# Patient Record
Sex: MALE | Race: BLACK OR AFRICAN AMERICAN | Employment: OTHER | URBAN - METROPOLITAN AREA
[De-identification: names, ages, dates, MRNs, and addresses within clinical notes are randomized per-mention and may not be internally consistent; named-entity substitution may affect disease eponyms.]

---

## 2017-01-05 DIAGNOSIS — G40.209 PARTIAL EPILEPSY WITH IMPAIRMENT OF CONSCIOUSNESS (HCC): ICD-10-CM

## 2017-01-05 DIAGNOSIS — I65.23 STENOSIS OF BOTH INTERNAL CAROTID ARTERIES: ICD-10-CM

## 2017-01-05 DIAGNOSIS — G40.209 LOCALIZATION-RELATED PARTIAL EPILEPSY WITH COMPLEX PARTIAL SEIZURES (HCC): ICD-10-CM

## 2017-01-05 DIAGNOSIS — I69.959 HEMIPLEGIA OF DOMINANT SIDE AS LATE EFFECT FOLLOWING CEREBROVASCULAR DISEASE (HCC): ICD-10-CM

## 2017-01-05 DIAGNOSIS — G40.209 COMPLEX PARTIAL SEIZURE EVOLVING TO GENERALIZED SEIZURE (HCC): ICD-10-CM

## 2017-01-05 DIAGNOSIS — I63.39 CEREBRAL INFARCTION DUE TO THROMBOSIS OF OTHER CEREBRAL ARTERY (HCC): ICD-10-CM

## 2017-01-05 DIAGNOSIS — Z86.718 HISTORY OF CEREBRAL THROMBOSIS: ICD-10-CM

## 2017-01-05 RX ORDER — PHENOBARBITAL 30 MG/1
30 TABLET ORAL 4 TIMES DAILY
Qty: 360 TAB | Refills: 5 | Status: SHIPPED | OUTPATIENT
Start: 2017-01-05 | End: 2018-07-11 | Stop reason: SDUPTHER

## 2017-01-05 NOTE — TELEPHONE ENCOUNTER
Future Appointments  Date Time Provider Benedicto Duenas   6/28/2017 9:00 AM Shu Rose MD [address]                         Last Appointment My Department:  6/28/16    Please advise of refill below. Requested Prescriptions     Pending Prescriptions Disp Refills    PHENobarbital (LUMINAL) 30 mg tablet 360 Tab 5     Sig: Take 1 Tab by mouth four (4) times daily.

## 2017-08-24 RX ORDER — PHENOBARBITAL 32.4 MG/1
32.4 TABLET ORAL 4 TIMES DAILY
Qty: 360 TAB | Refills: 1 | Status: SHIPPED | OUTPATIENT
Start: 2017-08-24 | End: 2018-02-15 | Stop reason: SDUPTHER

## 2017-08-24 NOTE — TELEPHONE ENCOUNTER
Future Appointments  Date Time Provider Benedicto Duenas   1/11/2018 8:40 AM Baljit Haas MD [address]                         Last Appointment My Department:  6/28/2016    Please advise of refill below. Requested Prescriptions     Pending Prescriptions Disp Refills    PHENobarbital (LUMINAL) 32.4 mg tablet 360 Tab 1     Sig: Take 1 Tab by mouth four (4) times daily.  Max Daily Amount: 129.6 mg.

## 2017-09-29 DIAGNOSIS — G40.209 COMPLEX PARTIAL SEIZURE EVOLVING TO GENERALIZED SEIZURE (HCC): ICD-10-CM

## 2017-09-29 DIAGNOSIS — I63.39 CEREBRAL INFARCTION DUE TO THROMBOSIS OF OTHER CEREBRAL ARTERY (HCC): ICD-10-CM

## 2017-09-29 DIAGNOSIS — G40.209 LOCALIZATION-RELATED PARTIAL EPILEPSY WITH COMPLEX PARTIAL SEIZURES (HCC): ICD-10-CM

## 2017-09-29 DIAGNOSIS — Z86.718 HISTORY OF CEREBRAL THROMBOSIS: ICD-10-CM

## 2017-09-29 DIAGNOSIS — I65.23 STENOSIS OF BOTH INTERNAL CAROTID ARTERIES: ICD-10-CM

## 2017-09-29 DIAGNOSIS — I69.959 HEMIPLEGIA OF DOMINANT SIDE AS LATE EFFECT FOLLOWING CEREBROVASCULAR DISEASE (HCC): ICD-10-CM

## 2017-09-29 DIAGNOSIS — G40.209 PARTIAL EPILEPSY WITH IMPAIRMENT OF CONSCIOUSNESS (HCC): ICD-10-CM

## 2017-09-29 RX ORDER — PHENYTOIN SODIUM 100 MG/1
100 CAPSULE, EXTENDED RELEASE ORAL 3 TIMES DAILY
Qty: 270 CAP | Refills: 1 | Status: SHIPPED | OUTPATIENT
Start: 2017-09-29 | End: 2018-03-15 | Stop reason: SDUPTHER

## 2017-09-29 NOTE — TELEPHONE ENCOUNTER
Future Appointments  Date Time Provider Benedicto Moorei   1/11/2018 8:40 AM Sage Chao MD [address]                         Last Appointment My Department:  6/30/2016    Please advise of refill below. Requested Prescriptions     Pending Prescriptions Disp Refills    DILANTIN EXTENDED 100 mg ER capsule 270 Cap 1     Sig: Take 1 Cap by mouth three (3) times daily.  GISELA

## 2017-10-11 ENCOUNTER — DOCUMENTATION ONLY (OUTPATIENT)
Dept: NEUROLOGY | Age: 67
End: 2017-10-11

## 2018-02-15 DIAGNOSIS — G40.209 LOCALIZATION-RELATED PARTIAL EPILEPSY WITH COMPLEX PARTIAL SEIZURES (HCC): Primary | ICD-10-CM

## 2018-02-15 RX ORDER — PHENOBARBITAL 32.4 MG/1
32.4 TABLET ORAL 4 TIMES DAILY
Qty: 360 TAB | Refills: 1 | Status: SHIPPED | OUTPATIENT
Start: 2018-02-15 | End: 2018-07-11 | Stop reason: CLARIF

## 2018-03-15 DIAGNOSIS — G40.209 PARTIAL EPILEPSY WITH IMPAIRMENT OF CONSCIOUSNESS (HCC): ICD-10-CM

## 2018-03-15 DIAGNOSIS — I63.39 CEREBRAL INFARCTION DUE TO THROMBOSIS OF OTHER CEREBRAL ARTERY (HCC): ICD-10-CM

## 2018-03-15 DIAGNOSIS — I69.959 HEMIPLEGIA OF DOMINANT SIDE AS LATE EFFECT FOLLOWING CEREBROVASCULAR DISEASE (HCC): ICD-10-CM

## 2018-03-15 DIAGNOSIS — I65.23 STENOSIS OF BOTH INTERNAL CAROTID ARTERIES: ICD-10-CM

## 2018-03-15 DIAGNOSIS — Z86.718 HISTORY OF CEREBRAL THROMBOSIS: ICD-10-CM

## 2018-03-15 DIAGNOSIS — G40.209 LOCALIZATION-RELATED PARTIAL EPILEPSY WITH COMPLEX PARTIAL SEIZURES (HCC): ICD-10-CM

## 2018-03-15 DIAGNOSIS — G40.209 COMPLEX PARTIAL SEIZURE EVOLVING TO GENERALIZED SEIZURE (HCC): ICD-10-CM

## 2018-03-15 RX ORDER — PHENYTOIN SODIUM 100 MG/1
100 CAPSULE, EXTENDED RELEASE ORAL 3 TIMES DAILY
Qty: 270 CAP | Refills: 1 | Status: SHIPPED | OUTPATIENT
Start: 2018-03-15 | End: 2018-07-11 | Stop reason: SDUPTHER

## 2018-03-15 RX ORDER — PHENYTOIN SODIUM 100 MG/1
CAPSULE, EXTENDED RELEASE ORAL
Qty: 270 CAP | Refills: 0 | OUTPATIENT
Start: 2018-03-15

## 2018-03-15 NOTE — TELEPHONE ENCOUNTER
Future Appointments  Date Time Provider Benedicto Moorei   7/11/2018 8:00 AM Rafy Recio MD [address]                         Last Appointment My Department:  6/28/2016    Please advise of refill below. Requested Prescriptions     Pending Prescriptions Disp Refills    DILANTIN EXTENDED 100 mg ER capsule 270 Cap 1     Sig: Take 1 Cap by mouth three (3) times daily. GISELA     Refused Prescriptions Disp Refills    DILANTIN EXTENDED 100 mg ER capsule [Pharmacy Med Name: DILANTIN 100 MG CAPSULE] 270 Cap 0     Sig: TAKE 1 CAPSULE BY MOUTH THREE TIMES A DAY.      Refused By: Adilson Stubbs     Reason for Refusal: Patient never under prescriber care

## 2018-07-11 ENCOUNTER — OFFICE VISIT (OUTPATIENT)
Dept: NEUROLOGY | Age: 68
End: 2018-07-11

## 2018-07-11 VITALS
HEART RATE: 78 BPM | WEIGHT: 315 LBS | OXYGEN SATURATION: 98 % | DIASTOLIC BLOOD PRESSURE: 70 MMHG | HEIGHT: 71 IN | BODY MASS INDEX: 44.1 KG/M2 | SYSTOLIC BLOOD PRESSURE: 166 MMHG

## 2018-07-11 DIAGNOSIS — Z86.718 HISTORY OF CEREBRAL THROMBOSIS: ICD-10-CM

## 2018-07-11 DIAGNOSIS — I65.23 STENOSIS OF BOTH INTERNAL CAROTID ARTERIES: ICD-10-CM

## 2018-07-11 DIAGNOSIS — I63.39 CEREBRAL INFARCTION DUE TO THROMBOSIS OF OTHER CEREBRAL ARTERY (HCC): ICD-10-CM

## 2018-07-11 DIAGNOSIS — I69.959 HEMIPLEGIA OF DOMINANT SIDE AS LATE EFFECT FOLLOWING CEREBROVASCULAR DISEASE (HCC): Primary | ICD-10-CM

## 2018-07-11 DIAGNOSIS — G40.209 PARTIAL EPILEPSY WITH IMPAIRMENT OF CONSCIOUSNESS (HCC): ICD-10-CM

## 2018-07-11 DIAGNOSIS — G40.209 LOCALIZATION-RELATED PARTIAL EPILEPSY WITH COMPLEX PARTIAL SEIZURES (HCC): ICD-10-CM

## 2018-07-11 DIAGNOSIS — G40.209 COMPLEX PARTIAL SEIZURE EVOLVING TO GENERALIZED SEIZURE (HCC): ICD-10-CM

## 2018-07-11 PROBLEM — E66.01 OBESITY, MORBID (HCC): Status: ACTIVE | Noted: 2018-07-11

## 2018-07-11 RX ORDER — PHENOBARBITAL 30 MG/1
30 TABLET ORAL 4 TIMES DAILY
Qty: 360 TAB | Refills: 5 | Status: SHIPPED | OUTPATIENT
Start: 2018-07-11 | End: 2019-05-10 | Stop reason: CLARIF

## 2018-07-11 RX ORDER — PHENYTOIN SODIUM 100 MG/1
100 CAPSULE, EXTENDED RELEASE ORAL 3 TIMES DAILY
Qty: 270 CAP | Refills: 4 | Status: SHIPPED | OUTPATIENT
Start: 2018-07-11 | End: 2019-08-20 | Stop reason: SDUPTHER

## 2018-07-11 NOTE — LETTER
7/11/2018 9:35 AM 
 
Patient:  Angie Barr Sr. YOB: 1950 Date of Visit: 7/11/2018 Dear No Recipients: Thank you for referring . Balwinder Mauricio to me for evaluation/treatment. Below are the relevant portions of my assessment and plan of care. Consult Subjective:  
 
Ron Gonzalez is a 79 y. o.right handed Afro-American male seen today for evaluation at the request of Dr. Shavonne Nunez of his stroke and seizures that occurred 20 years ago leaving him with mild right hemiparesis and seizures, and the patient has done well without recurring seizures or new medical problems other than asthma and shortness of breath that sounds a little bit more cardiogenic because he can only walk a short distance before he gets short of breath. He continues to take phenobarbital 30 mg 4 times a day and Dilantin 100 mg 3 times a day is a not had a seizure in 20 years. He had no recurrent strokelike symptoms, but has not had a carotid Doppler study done in 2 years and did have inability to image the vertebral arteries last time, so we will repeat his Doppler again to make sure there is no progressive stenosis or cerebrovascular insufficiency. . His last seizure was 20 years ago and is taking Dilantin 100 mg 3 times a day and phenobarbital 32 mg 4 times a day, and tolerating his medication well, but he comes in need refills on his medicines. His last levels showed a borderline low Dilantin 7 or 8, but phenobarbital was 20, and he is doing well. He's had no recurrent strokes but occasionally gets some lightheadedness and dizziness and a sensation of weakness. He has a mild right hemiparesis from his previous stroke 3 years ago. He continues on Coumadin for anticoagulation. He has a history of a clipped muscle in his left eye in the past for strabismus. His carotid Dopplers will be repeated on Thursday. Gina Kelly  He's had no new focal weakness, sensory loss, visual changes, cognitive changes or any other new focal neurological symptoms. He had no recurrent strokelike symptoms, on chronic Coumadin therapy for his atrial fibrillation. He had no recurrent seizures, new focal weakness, sensory loss, visual changes, cognitive issues or other strokelike symptoms. His carotid Doppler showed less than 50% disease in the past. We will repeat those Dopplers in 1 weeks. He has significant degenerative arthritis and has to walk with a cane and move slowly cause of his arthritis. Patient was last seen 1 years ago and has remained stable Patient's complete review of systems in symptoms was negative for any new medical problems palpitations or illnesses. Past Medical History:  
Diagnosis Date  Arthritis  Essential hypertension  GERD (gastroesophageal reflux disease)  Gout  Hypertension  Morbid obesity ([address]  Persistent dry cough  Seizures ([address] NONE IN 25 YR  Sleep apnea USES CPAP  
 Unspecified cerebral artery occlusion with cerebral infarction   
 taking coumadin  Wheezing Past Surgical History:  
Procedure Laterality Date  HX GI    
 COLONOSCOPY/EGD  HX HEENT \"clipped muscle\" in left eye Family History Problem Relation Age of Onset  Hypertension Mother  Other Mother   
  cerebral aneurysm  Stroke Father  Hypertension Sister  Hypertension Child  Asthma Neg Hx  Cancer Neg Hx  Diabetes Neg Hx   
 Heart Disease Neg Hx  Malignant Hyperthermia Neg Hx  Pseudocholinesterase Deficiency Neg Hx  Delayed Awakening Neg Hx  Post-op Nausea/Vomiting Neg Hx Social History Substance Use Topics  Smoking status: Former Smoker Years: 30.00 Quit date: 11/3/2001  Smokeless tobacco: Never Used Comment: SMOKED CIGARS  Alcohol use No  
   
Current Outpatient Prescriptions Medication Sig Dispense Refill  PHENobarbital (LUMINAL) 30 mg tablet Take 1 Tab by mouth four (4) times daily. 360 Tab 5  DILANTIN EXTENDED 100 mg ER capsule Take 1 Cap by mouth three (3) times daily. GISELA 270 Cap 4  
 atorvastatin (LIPITOR) 20 mg tablet Take  by mouth daily.  ALBUTEROL SULFATE (VENTOLIN HFA IN) Take  by inhalation.  cpap machine kit by Does Not Apply route.  warfarin (COUMADIN) 2.5 mg tablet Take 2.5 mg by mouth as directed. Take 2.5 mg (1 tablet) every Monday. Take 5 mg (2 tablets) Tuesday through Sunday.  fluticasone-salmeterol (ADVAIR DISKUS) 500-50 mcg/dose diskus inhaler Take 1 Puff by inhalation every twelve (12) hours.  allopurinol (ZYLOPRIM) 300 mg tablet Take  by mouth daily.  BENEFIBER, GUAR GUM, PO Take  by mouth.  valsartan-hydrochlorothiazide (DIOVAN HCT) 160-25 mg per tablet Take 1 Tab by mouth daily.  folic acid (FOLVITE) 1 mg tablet Take  by mouth daily.  ipratropium (ATROVENT) 0.06 % nasal spray 2 Sprays four (4) times daily.  pantoprazole (PROTONIX) 40 mg tablet Take 40 mg by mouth daily.  FERROUS SULFATE (SLOW FE PO) Take  by mouth.  cholecalciferol (VITAMIN D3) 1,000 unit tablet Take  by mouth daily. Allergies Allergen Reactions  Augmentin [Amoxicillin-Pot Clavulanate] Hives  Keflex [Cephalexin] Hives  Pcn [Penicillins] Hives Review of Systems: A comprehensive review of systems was negative except for: Cardiovascular: positive for chest pressure/discomfort, dyspnea, palpitations, irregular heart beats, exertional chest pressure/discomfort, atrial fibrillation Musculoskeletal: positive for myalgias, arthralgias, stiff joints, back pain and muscle weakness Neurological: positive for paresthesia, coordination problems, gait problems and weakness Behvioral/Psych: positive for anxiety and depression Objective: I 
 
 
NEUROLOGICAL EXAM: 
 
Appearance:   The patient is well developed, well nourished,  Obese, provides a coherent history and is in no acute distress. Mental Status: Oriented to place and person, and the president, but not the date, and does seem a little slow mentally and has some memory problems, but speech is fluent without aphasia or dysarthria. Mood and affect slow. Cranial Nerves:   Intact visual fields. Fundi are benign. TALIA, EOM's full, no nystagmus, no ptosis. Facial sensation is normal. Corneal reflexes are not tested. Facial movement is symmetric. Hearing is normal bilaterally. Palate is midline with normal sternocleidomastoid and trapezius muscles are normal. Tongue is midline. Neck without meningismus or bruits Temporal arteries are not tender or enlarged Motor:  4/5 strength in upper and lower proximal and distal muscles, the patient probably had a minimal right hemiparesis. Normal bulk and tone. No fasciculations. Reflexes:   Deep tendon reflexes 1+/4 and symmetrical. No clear Babinski or clonus present Sensory:   Normal to touch, pinprick and vibration mildly decreased in both lower extremities. DSS is intact Gait:  Abnormal gait, as patient has to walk with a cane and move slowly due to severe arthritis and back and knees. Tremor:   No tremor noted. Cerebellar:  No cerebellar signs present. Neurovascular:  Abnormal heart sounds and irregular rhythm, peripheral pulses decreased in both feet, and no carotid bruits. Assessment:  
 
Plan:  
 
Patient with previous stroke, on Coumadin atrial fibrillation, without recurrent neurologic symptoms, will repeat his carotid Doppler study since been more than 2 years we could not image his vertebral arteries last time to make sure there is no vertebrobasilar insufficiency Patient without seizures for 20 years, we will recheck his levels of his medications, and all of his medications refilled for the patient today for his seizures.  
He was counseled about his stroke, I think he needs to control his blood pressure, his blood sugars, not to smoke which she does not, and to control his cholesterol his PCP is managing that and he seems to be doing well from that standpoint. We will recheck his levels of his medications to make sure they have not dropped to low in a near the therapeutic range. We discussed the condition with the patient and his wife in detail, and they are happy with his current treatment and medical status, and want to just continue the current dose of medications which we will do. Continue current medications. Check carotid Dopplers in 2 days His levels of his anticonvulsants were relatively normal one year ago He is to try to exercise regularly, lose weight, and take his vitamins daily Followup in one years time or earlier if needed. Patient condition discuss with patient and his wife in detail. They agree with current plans in therapy Signed By: Sae Boswell MD   
 July 11, 2018 This note will not be viewable in [address] If you have questions, please do not hesitate to call me. I look forward to following Mr. Daryl Mendoza along with you. Sincerely, Sae Boswell MD

## 2018-07-11 NOTE — PATIENT INSTRUCTIONS
Office Policies 
 
o Phone calls/patient messages: Please allow up to 24 hours for someone in the office to contact you about your call or message. Be mindful your provider may be out of the office or your message may require further review. We encourage you to use [employer] for your messages as this is a faster, more efficient way to communicate with our office 
 
o Medication Refills: 
Prescription medications require up to 48 business hours to process. We encourage you to use [employer] for your refills. For controlled medications: Please allow up to 72 business hours to process. Certain medications may require you to  a written prescription at our office. NO narcotic/controlled medications will be prescribed after 4pm Monday through Friday or on weekends 
 
o Form/Paperwork Completion: 
Please note there is a $25 fee for all paperwork completed by our providers. We ask that you allow 7-14 business days. Pre-payment is due prior to picking up/faxing the completed form. You may also download your forms to [employer] to have your doctor print off. A Healthy Lifestyle: Care Instructions Your Care Instructions A healthy lifestyle can help you feel good, stay at a healthy weight, and have plenty of energy for both work and play. A healthy lifestyle is something you can share with your whole family. A healthy lifestyle also can lower your risk for serious health problems, such as high blood pressure, heart disease, and diabetes. You can follow a few steps listed below to improve your health and the health of your family. Follow-up care is a key part of your treatment and safety. Be sure to make and go to all appointments, and call your doctor if you are having problems. It's also a good idea to know your test results and keep a list of the medicines you take. How can you care for yourself at home? · Do not eat too much sugar, fat, or fast foods. You can still have dessert and treats now and then. The goal is moderation. · Start small to improve your eating habits. Pay attention to portion sizes, drink less juice and soda pop, and eat more fruits and vegetables. ¨ Eat a healthy amount of food. A 3-ounce serving of meat, for example, is about the size of a deck of cards. Fill the rest of your plate with vegetables and whole grains. ¨ Limit the amount of soda and sports drinks you have every day. Drink more water when you are thirsty. ¨ Eat at least 5 servings of fruits and vegetables every day. It may seem like a lot, but it is not hard to reach this goal. A serving or helping is 1 piece of fruit, 1 cup of vegetables, or 2 cups of leafy, raw vegetables. Have an apple or some carrot sticks as an afternoon snack instead of a candy bar. Try to have fruits and/or vegetables at every meal. 
· Make exercise part of your daily routine. You may want to start with simple activities, such as walking, bicycling, or slow swimming. Try to be active 30 to 60 minutes every day. You do not need to do all 30 to 60 minutes all at once. For example, you can exercise 3 times a day for 10 or 20 minutes. Moderate exercise is safe for most people, but it is always a good idea to talk to your doctor before starting an exercise program. 
· Keep moving. Anaya Rodriguezs the lawn, work in the garden, or [employer]. Take the stairs instead of the elevator at work. · If you smoke, quit. People who smoke have an increased risk for heart attack, stroke, cancer, and other lung illnesses. Quitting is hard, but there are ways to boost your chance of quitting tobacco for good. ¨ Use nicotine gum, patches, or lozenges. ¨ Ask your doctor about stop-smoking programs and medicines. ¨ Keep trying. In addition to reducing your risk of diseases in the future, you will notice some benefits soon after you stop using tobacco. If you have shortness of breath or asthma symptoms, they will likely get better within a few weeks after you quit.  
· Limit how much alcohol you drink. Moderate amounts of alcohol (up to 2 drinks a day for men, 1 drink a day for women) are okay. But drinking too much can lead to liver problems, high blood pressure, and other health problems. Family health If you have a family, there are many things you can do together to improve your health. · Eat meals together as a family as often as possible. · Eat healthy foods. This includes fruits, vegetables, lean meats and dairy, and whole grains. · Include your family in your fitness plan. Most people think of activities such as jogging or tennis as the way to fitness, but there are many ways you and your family can be more active. Anything that makes you breathe hard and gets your heart pumping is exercise. Here are some tips: 
¨ Walk to do errands or to take your child to school or the bus. ¨ Go for a family bike ride after dinner instead of watching TV. Where can you learn more? Go to http://josue-esau.info/. Enter H720 in the search box to learn more about \"A Healthy Lifestyle: Care Instructions. \" Current as of: December 7, 2017 Content Version: 11.7 © 4433-5652 [employer]. Care instructions adapted under license by [employer] (which disclaims liability or warranty for this information). If you have questions about a medical condition or this instruction, always ask your healthcare professional. [address] any warranty or liability for your use of this information.

## 2018-07-11 NOTE — MR AVS SNAPSHOT
[address], 
[address], [address] [facility] 
[phone] Patient: Maci Barros Sr. MRN: [id number] [facility]:[birth year] Visit Information Date & Time Provider Department Dept. Phone Encounter #  
 7/11/2018  8:00 AM Pat Rudolph MD Neurology [facility] [phone] [id number] Follow-up Instructions Return in about 1 year (around 7/11/2019). Upcoming Health Maintenance Date Due Hepatitis C Screening 1950 DTaP/Tdap/Td series (1 - Tdap) 9/3/1971 FOBT Q 1 YEAR AGE 50-75 9/3/2000 ZOSTER VACCINE AGE 60> 7/3/2010 GLAUCOMA SCREENING Q2Y 9/3/2015 Pneumococcal 65+ Low/Medium Risk (1 of 2 - PCV13) 9/3/2015 MEDICARE YEARLY EXAM 3/14/2018 Influenza Age 5 to Adult 8/1/2018 Allergies as of 7/11/2018  Review Complete On: 7/11/2018 By: Pat Rudolph MD  
  
 Severity Noted Reaction Type Reactions Augmentin [Amoxicillin-pot Clavulanate]  03/21/2013    Hives Keflex [Cephalexin]  03/21/2013    Hives Pcn [Penicillins]  03/21/2013    Hives Current Immunizations  Never Reviewed No immunizations on file. Not reviewed this visit You Were Diagnosed With   
  
 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    -  Primary ICD-10-CM: P23.643 ICD-9-CM: 438.21 Complex partial seizure evolving to generalized seizure ([facility]     ICD-10-CM: X00.176 ICD-9-CM: 345.40 History of cerebral thrombosis     ICD-10-CM: Z86.73 
ICD-9-CM: V12.54 Stenosis of both internal carotid arteries     ICD-10-CM: I65.23 ICD-9-CM: 433.10, 433.30 Cerebral infarction due to thrombosis of other cerebral artery (HCC)     ICD-10-CM: I63.39 ICD-9-CM: 434.01 Partial epilepsy with impairment of consciousness ([facility]     ICD-10-CM: V87.897 ICD-9-CM: 345.40 Localization-related partial epilepsy with complex partial seizures ([facility]     ICD-10-CM: K97.474 ICD-9-CM: 345.40 Vitals BP Pulse Height(growth percentile) Weight(growth percentile) SpO2 BMI  
 166/70 78 5' 11\" (1.803 m) (!) 381 lb (172.8 kg) 98% 53.14 kg/m2 Smoking Status Former Smoker BMI and BSA Data Body Mass Index Body Surface Area  
 53.14 kg/m 2 2.94 m 2 Preferred Pharmacy Pharmacy Name Phone [address], [address] [facility] [phone] Your Updated Medication List  
  
   
This list is accurate as of 7/11/18  8:40 AM.  Always use your most recent med list.  
  
  
  
  
 ADVAIR DISKUS 500-50 mcg/dose diskus inhaler Generic drug:  fluticasone-salmeterol Take 1 Puff by inhalation every twelve (12) hours. allopurinol 300 mg tablet Commonly known as:  Ellis Night Take  by mouth daily. atorvastatin 20 mg tablet Commonly known as:  LIPITOR Take  by mouth daily. BENEFIBER (GUAR GUM) PO Take  by mouth. cpap machine kit  
by Does Not Apply route. DILANTIN EXTENDED 100 mg ER capsule Generic drug:  phenytoin ER Take 1 Cap by mouth three (3) times daily. GISELA  
  
 DIOVAN -25 mg per tablet Generic drug:  valsartan-hydroCHLOROthiazide Take 1 Tab by mouth daily. folic acid 1 mg tablet Commonly known as:  Arturo Ramírezden Take  by mouth daily. ipratropium 42 mcg (0.06 %) nasal spray Commonly known as:  ATROVENT  
2 Sprays four (4) times daily. pantoprazole 40 mg tablet Commonly known as:  PROTONIX Take 40 mg by mouth daily. PHENobarbital 30 mg tablet Commonly known as:  LUMINAL Take 1 Tab by mouth four (4) times daily. SLOW FE PO Take  by mouth. VENTOLIN HFA IN Take  by inhalation. VITAMIN D3 1,000 unit tablet Generic drug:  cholecalciferol Take  by mouth daily. warfarin 2.5 mg tablet Commonly known as:  COUMADIN Take 2.5 mg by mouth as directed. Take 2.5 mg (1 tablet) every Monday. Take 5 mg (2 tablets) Tuesday through Sunday. Prescriptions Printed Refills PHENobarbital (LUMINAL) 30 mg tablet 5 Sig: Take 1 Tab by mouth four (4) times daily. Class: Print Route: Oral  
  
Prescriptions Sent to Pharmacy Refills DILANTIN EXTENDED 100 mg ER capsule 4 Sig: Take 1 Cap by mouth three (3) times daily. GISELA Class: Normal  
 Pharmacy: [address] [facility], [address] [place] [address] #: [phone] Route: Oral  
  
We Performed the Following PHENOBARBITAL [id number] CPT(R)] PHENYTOIN, TOTAL & FREE [id number] CPT(R)] Follow-up Instructions Return in about 1 year (around 7/11/2019). To-Do List   
 07/19/2018 Imaging:  DUPLEX CAROTID BILATERAL AMB NEURO Patient Instructions Office Policies 
 
o Phone calls/patient messages: Please allow up to 24 hours for someone in the office to contact you about your call or message. Be mindful your provider may be out of the office or your message may require further review. We encourage you to use [employer] for your messages as this is a faster, more efficient way to communicate with our office 
 
o Medication Refills: 
Prescription medications require up to 48 business hours to process. We encourage you to use [employer] for your refills. For controlled medications: Please allow up to 72 business hours to process. Certain medications may require you to  a written prescription at our office. NO narcotic/controlled medications will be prescribed after 4pm Monday through Friday or on weekends 
 
o Form/Paperwork Completion: 
Please note there is a $25 fee for all paperwork completed by our providers. We ask that you allow 7-14 business days. Pre-payment is due prior to picking up/faxing the completed form.  You may also download your forms to Jaet to have your doctor print off. A Healthy Lifestyle: Care Instructions Your Care Instructions A healthy lifestyle can help you feel good, stay at a healthy weight, and have plenty of energy for both work and play. A healthy lifestyle is something you can share with your whole family. A healthy lifestyle also can lower your risk for serious health problems, such as high blood pressure, heart disease, and diabetes. You can follow a few steps listed below to improve your health and the health of your family. Follow-up care is a key part of your treatment and safety. Be sure to make and go to all appointments, and call your doctor if you are having problems. It's also a good idea to know your test results and keep a list of the medicines you take. How can you care for yourself at home? · Do not eat too much sugar, fat, or fast foods. You can still have dessert and treats now and then. The goal is moderation. · Start small to improve your eating habits. Pay attention to portion sizes, drink less juice and soda pop, and eat more fruits and vegetables. ¨ Eat a healthy amount of food. A 3-ounce serving of meat, for example, is about the size of a deck of cards. Fill the rest of your plate with vegetables and whole grains. ¨ Limit the amount of soda and sports drinks you have every day. Drink more water when you are thirsty. ¨ Eat at least 5 servings of fruits and vegetables every day. It may seem like a lot, but it is not hard to reach this goal. A serving or helping is 1 piece of fruit, 1 cup of vegetables, or 2 cups of leafy, raw vegetables. Have an apple or some carrot sticks as an afternoon snack instead of a candy bar. Try to have fruits and/or vegetables at every meal. 
· Make exercise part of your daily routine. You may want to start with simple activities, such as walking, bicycling, or slow swimming. Try to be active 30 to 60 minutes every day.  You do not need to do all 30 to 60 minutes all at once. For example, you can exercise 3 times a day for 10 or 20 minutes. Moderate exercise is safe for most people, but it is always a good idea to talk to your doctor before starting an exercise program. 
· Keep moving. Vishnu Goltz the lawn, work in the garden, or [place] Oy. Take the stairs instead of the elevator at work. · If you smoke, quit. People who smoke have an increased risk for heart attack, stroke, cancer, and other lung illnesses. Quitting is hard, but there are ways to boost your chance of quitting tobacco for good. ¨ Use nicotine gum, patches, or lozenges. ¨ Ask your doctor about stop-smoking programs and medicines. ¨ Keep trying. In addition to reducing your risk of diseases in the future, you will notice some benefits soon after you stop using tobacco. If you have shortness of breath or asthma symptoms, they will likely get better within a few weeks after you quit. · Limit how much alcohol you drink. Moderate amounts of alcohol (up to 2 drinks a day for men, 1 drink a day for women) are okay. But drinking too much can lead to liver problems, high blood pressure, and other health problems. Family health If you have a family, there are many things you can do together to improve your health. · Eat meals together as a family as often as possible. · Eat healthy foods. This includes fruits, vegetables, lean meats and dairy, and whole grains. · Include your family in your fitness plan. Most people think of activities such as jogging or tennis as the way to fitness, but there are many ways you and your family can be more active. Anything that makes you breathe hard and gets your heart pumping is exercise. Here are some tips: 
¨ Walk to do errands or to take your child to school or the bus. ¨ Go for a family bike ride after dinner instead of watching TV. Where can you learn more? Go to http://josue-esau.info/. Enter P287 in the search box to learn more about \"A Healthy Lifestyle: Care Instructions. \" Current as of: December 7, 2017 Content Version: 11.7 © 5659-6628 [employer], [employer]. Care instructions adapted under license by [employer] (which disclaims liability or warranty for this information). If you have questions about a medical condition or this instruction, always ask your healthcare professional. [address] any warranty or liability for your use of this information. Introducing [facility]! Marie Arce introduces MongoS[employer]ice patient portal. Now you can access parts of your medical record, email your doctor's office, and request medication refills online. 1. In your internet browser, go to [email or web address] 2. Click on the First Time User? Click Here link in the Sign In box. You will see the New Member Sign Up page. 3. Enter your [employer] Access Code exactly as it appears below. You will not need to use this code after youve completed the sign-up process. If you do not sign up before the expiration date, you must request a new code. · [employer] Access Code: [email or web address] Expires: 10/9/2018  8:40 AM 
 
4. Enter the last four digits of your Social Security Number (xxxx) and Date of Birth (mm/dd/yyyy) as indicated and click Submit. You will be taken to the next sign-up page. 5. Create a MongoS[employer]ice ID. This will be your [employer] login ID and cannot be changed, so think of one that is secure and easy to remember. 6. Create a [employer] password. You can change your password at any time. 7. Enter your Password Reset Question and Answer. This can be used at a later time if you forget your password. 8. Enter your e-mail address. You will receive e-mail notification when new information is available in [address] 9. Click Sign Up. You can now view and download portions of your medical record. 10. Click the Download Summary menu link to download a portable copy of your medical information. If you have questions, please visit the Frequently Asked Questions section of the [employer] website. Remember, [employer] is NOT to be used for urgent needs. For medical emergencies, dial 911. Now available from your iPhone and Android! Please provide this summary of care documentation to your next provider. Your primary care clinician is listed as Rell Quintero. If you have any questions after today's visit, please call [phone].

## 2018-07-11 NOTE — PROGRESS NOTES
Consult    Subjective:     Honey Black is a 79 y. o.right handed Afro-American male seen today for evaluation at the request of Dr. Bridget Lopez of his stroke and seizures that occurred 20 years ago leaving him with mild right hemiparesis and seizures, and the patient has done well without recurring seizures or new medical problems other than asthma and shortness of breath that sounds a little bit more cardiogenic because he can only walk a short distance before he gets short of breath. He continues to take phenobarbital 30 mg 4 times a day and Dilantin 100 mg 3 times a day is a not had a seizure in 20 years. He had no recurrent strokelike symptoms, but has not had a carotid Doppler study done in 2 years and did have inability to image the vertebral arteries last time, so we will repeat his Doppler again to make sure there is no progressive stenosis or cerebrovascular insufficiency. . His last seizure was 20 years ago and is taking Dilantin 100 mg 3 times a day and phenobarbital 32 mg 4 times a day, and tolerating his medication well, but he comes in need refills on his medicines. His last levels showed a borderline low Dilantin 7 or 8, but phenobarbital was 20, and he is doing well. He's had no recurrent strokes but occasionally gets some lightheadedness and dizziness and a sensation of weakness. He has a mild right hemiparesis from his previous stroke 3 years ago. He continues on Coumadin for anticoagulation. He has a history of a clipped muscle in his left eye in the past for strabismus. His carotid Dopplers will be repeated on Thursday. Villar Junk He's had no new focal weakness, sensory loss, visual changes, cognitive changes or any other new focal neurological symptoms. He had no recurrent strokelike symptoms, on chronic Coumadin therapy for his atrial fibrillation. He had no recurrent seizures, new focal weakness, sensory loss, visual changes, cognitive issues or other strokelike symptoms.   His carotid Doppler showed less than 50% disease in the past. We will repeat those Dopplers in 1 weeks. He has significant degenerative arthritis and has to walk with a cane and move slowly cause of his arthritis. Patient was last seen 1 years ago and has remained stable     Patient's complete review of systems in symptoms was negative for any new medical problems palpitations or illnesses. Past Medical History:   Diagnosis Date    Arthritis     Essential hypertension     GERD (gastroesophageal reflux disease)     Gout     Hypertension     Morbid obesity ([address]     Persistent dry cough     Seizures (HCC)     NONE IN 25 YR    Sleep apnea     USES CPAP    Unspecified cerebral artery occlusion with cerebral infarction     taking coumadin    Wheezing       Past Surgical History:   Procedure Laterality Date    HX GI      COLONOSCOPY/EGD    HX HEENT      \"clipped muscle\" in left eye     Family History   Problem Relation Age of Onset    Hypertension Mother     Other Mother      cerebral aneurysm    Stroke Father     Hypertension Sister     Hypertension Child     Asthma Neg Hx     Cancer Neg Hx     Diabetes Neg Hx     Heart Disease Neg Hx     Malignant Hyperthermia Neg Hx     Pseudocholinesterase Deficiency Neg Hx     Delayed Awakening Neg Hx     Post-op Nausea/Vomiting Neg Hx       Social History   Substance Use Topics    Smoking status: Former Smoker     Years: 30.00     Quit date: 11/3/2001    Smokeless tobacco: Never Used      Comment: SMOKED CIGARS    Alcohol use No       Current Outpatient Prescriptions   Medication Sig Dispense Refill    PHENobarbital (LUMINAL) 30 mg tablet Take 1 Tab by mouth four (4) times daily. 360 Tab 5    DILANTIN EXTENDED 100 mg ER capsule Take 1 Cap by mouth three (3) times daily. GISELA 270 Cap 4    atorvastatin (LIPITOR) 20 mg tablet Take  by mouth daily.  ALBUTEROL SULFATE (VENTOLIN HFA IN) Take  by inhalation.  cpap machine kit by Does Not Apply route.       warfarin (COUMADIN) 2.5 mg tablet Take 2.5 mg by mouth as directed. Take 2.5 mg (1 tablet) every Monday. Take 5 mg (2 tablets) Tuesday through Sunday.  fluticasone-salmeterol (ADVAIR DISKUS) 500-50 mcg/dose diskus inhaler Take 1 Puff by inhalation every twelve (12) hours.  allopurinol (ZYLOPRIM) 300 mg tablet Take  by mouth daily.  BENEFIBER, GUAR GUM, PO Take  by mouth.  valsartan-hydrochlorothiazide (DIOVAN HCT) 160-25 mg per tablet Take 1 Tab by mouth daily.  folic acid (FOLVITE) 1 mg tablet Take  by mouth daily.  ipratropium (ATROVENT) 0.06 % nasal spray 2 Sprays four (4) times daily.  pantoprazole (PROTONIX) 40 mg tablet Take 40 mg by mouth daily.  FERROUS SULFATE (SLOW FE PO) Take  by mouth.  cholecalciferol (VITAMIN D3) 1,000 unit tablet Take  by mouth daily. Allergies   Allergen Reactions    Augmentin [Amoxicillin-Pot Clavulanate] Hives    Keflex [Cephalexin] Hives    Pcn [Penicillins] Hives        Review of Systems:  A comprehensive review of systems was negative except for: Cardiovascular: positive for chest pressure/discomfort, dyspnea, palpitations, irregular heart beats, exertional chest pressure/discomfort, atrial fibrillation  Musculoskeletal: positive for myalgias, arthralgias, stiff joints, back pain and muscle weakness  Neurological: positive for paresthesia, coordination problems, gait problems and weakness  Behvioral/Psych: positive for anxiety and depression     Objective:     I      NEUROLOGICAL EXAM:    Appearance: The patient is well developed, well nourished,  Obese, provides a coherent history and is in no acute distress. Mental Status: Oriented to place and person, and the president, but not the date, and does seem a little slow mentally and has some memory problems, but speech is fluent without aphasia or dysarthria. Mood and affect slow. Cranial Nerves:   Intact visual fields. Fundi are benign.  TALIA, EOM's full, no nystagmus, no ptosis. Facial sensation is normal. Corneal reflexes are not tested. Facial movement is symmetric. Hearing is normal bilaterally. Palate is midline with normal sternocleidomastoid and trapezius muscles are normal. Tongue is midline. Neck without meningismus or bruits  Temporal arteries are not tender or enlarged   Motor:  4/5 strength in upper and lower proximal and distal muscles, the patient probably had a minimal right hemiparesis. Normal bulk and tone. No fasciculations. Reflexes:   Deep tendon reflexes 1+/4 and symmetrical. No clear Babinski or clonus present   Sensory:   Normal to touch, pinprick and vibration mildly decreased in both lower extremities. DSS is intact   Gait:  Abnormal gait, as patient has to walk with a cane and move slowly due to severe arthritis and back and knees. Tremor:   No tremor noted. Cerebellar:  No cerebellar signs present. Neurovascular:  Abnormal heart sounds and irregular rhythm, peripheral pulses decreased in both feet, and no carotid bruits. Assessment:     Plan:     Patient with previous stroke, on Coumadin atrial fibrillation, without recurrent neurologic symptoms, will repeat his carotid Doppler study since been more than 2 years we could not image his vertebral arteries last time to make sure there is no vertebrobasilar insufficiency  Patient without seizures for 20 years, we will recheck his levels of his medications, and all of his medications refilled for the patient today for his seizures. He was counseled about his stroke, I think he needs to control his blood pressure, his blood sugars, not to smoke which she does not, and to control his cholesterol his PCP is managing that and he seems to be doing well from that standpoint. We will recheck his levels of his medications to make sure they have not dropped to low in a near the therapeutic range.   We discussed the condition with the patient and his wife in detail, and they are happy with his current treatment and medical status, and want to just continue the current dose of medications which we will do. Continue current medications. Check carotid Dopplers in 2 days  I reviewed his carotid Doppler studies personally on the PACS system myself done 2 years ago and decided he indeed needs another Doppler  His levels of his anticonvulsants were relatively normal one year ago  He is to try to exercise regularly, lose weight, and take his vitamins daily  Followup in one years time or earlier if needed. Patient condition discuss with patient and his wife in detail. They agree with current plans in therapy    Signed By: Juice Nolan MD     July 11, 2018         This note will not be viewable in [address]

## 2018-07-13 LAB
PHENOBARB SERPL-MCNC: 33 UG/ML (ref 15–40)
PHENYTOIN FREE SERPL-MCNC: 1.2 UG/ML (ref 1–2)
PHENYTOIN SERPL-MCNC: 11.5 UG/ML (ref 10–20)

## 2018-07-26 ENCOUNTER — OFFICE VISIT (OUTPATIENT)
Dept: NEUROLOGY | Age: 68
End: 2018-07-26

## 2018-07-26 DIAGNOSIS — G40.209 LOCALIZATION-RELATED PARTIAL EPILEPSY WITH COMPLEX PARTIAL SEIZURES (HCC): ICD-10-CM

## 2018-07-26 DIAGNOSIS — G40.209 PARTIAL EPILEPSY WITH IMPAIRMENT OF CONSCIOUSNESS (HCC): ICD-10-CM

## 2018-07-26 DIAGNOSIS — I65.23 STENOSIS OF BOTH INTERNAL CAROTID ARTERIES: ICD-10-CM

## 2018-07-26 DIAGNOSIS — I63.39 CEREBRAL INFARCTION DUE TO THROMBOSIS OF OTHER CEREBRAL ARTERY (HCC): ICD-10-CM

## 2018-07-26 DIAGNOSIS — G40.209 COMPLEX PARTIAL SEIZURE EVOLVING TO GENERALIZED SEIZURE (HCC): ICD-10-CM

## 2018-07-26 DIAGNOSIS — Z86.718 HISTORY OF CEREBRAL THROMBOSIS: Primary | ICD-10-CM

## 2018-07-26 DIAGNOSIS — I69.959 HEMIPLEGIA OF DOMINANT SIDE AS LATE EFFECT FOLLOWING CEREBROVASCULAR DISEASE (HCC): ICD-10-CM

## 2018-07-27 NOTE — PROCEDURES
This study consisted of pulsed wave Doppler examination, Color-flow imaging, and Duplex imaging of both the right and left carotid systems, and both vertebral arteries.        Imaging of both right and left carotid systems showed mild mixed plaquing at the bifurcations and proximal and distal internal and external carotid arteries bilaterally, with stenosis in the range of 16-49% only and with no flow abnormalities identified.        Neither vertebral artery could be adequately identified, most likely representing technical difficulty, but cannot completely rule out congenital variant, or occlusive cerebrovascular disease. If clinically indicated other imaging modalities like MRA or CTA may be of further diagnostic value if clinically indicated. Patient's previous Doppler in 2013 had shown normal antegrade flow in both vertebral arteries.     Clinical correlation recommended.

## 2018-08-10 ENCOUNTER — TELEPHONE (OUTPATIENT)
Dept: NEUROLOGY | Age: 68
End: 2018-08-10

## 2018-08-10 NOTE — TELEPHONE ENCOUNTER
I called the pharmacy and called in the phenobarbital 32.4mg four times daily. Patient is taking 32.4mg of phenobarbital not 30mg phenobarbital per pharmacy.   I asked the pharmacy to discontinue the 30mg as they have not filled this dose for the patient

## 2018-08-15 ENCOUNTER — DOCUMENTATION ONLY (OUTPATIENT)
Dept: NEUROLOGY | Age: 68
End: 2018-08-15

## 2018-12-26 DIAGNOSIS — G40.209 COMPLEX PARTIAL SEIZURE EVOLVING TO GENERALIZED SEIZURE (HCC): ICD-10-CM

## 2018-12-26 RX ORDER — PHENOBARBITAL 32.4 MG/1
TABLET ORAL
Qty: 180 TAB | Refills: 0 | Status: SHIPPED | OUTPATIENT
Start: 2018-12-26 | End: 2019-02-13 | Stop reason: SDUPTHER

## 2019-02-13 DIAGNOSIS — G40.209 COMPLEX PARTIAL SEIZURE EVOLVING TO GENERALIZED SEIZURE (HCC): ICD-10-CM

## 2019-02-14 NOTE — TELEPHONE ENCOUNTER
Future Appointments   Date Time Provider Benedicto Duneas   8/5/2019  8:40 AM Dana Levy MD [address] [place] [address]                         Last Appointment My Department:  7/26/18    Please advise of refill below.

## 2019-02-15 DIAGNOSIS — G40.209 COMPLEX PARTIAL SEIZURE EVOLVING TO GENERALIZED SEIZURE (HCC): ICD-10-CM

## 2019-02-15 RX ORDER — PHENOBARBITAL 32.4 MG/1
TABLET ORAL
Qty: 180 TAB | Refills: 1 | Status: SHIPPED | OUTPATIENT
Start: 2019-02-15 | End: 2019-02-15 | Stop reason: SDUPTHER

## 2019-02-15 RX ORDER — PHENOBARBITAL 32.4 MG/1
TABLET ORAL
Qty: 180 TAB | Refills: 0 | Status: SHIPPED | OUTPATIENT
Start: 2019-02-15 | End: 2019-05-08 | Stop reason: SDUPTHER

## 2019-02-18 ENCOUNTER — DOCUMENTATION ONLY (OUTPATIENT)
Dept: NEUROLOGY | Age: 69
End: 2019-02-18

## 2019-05-08 DIAGNOSIS — G40.209 COMPLEX PARTIAL SEIZURE EVOLVING TO GENERALIZED SEIZURE (HCC): ICD-10-CM

## 2019-05-08 RX ORDER — PHENOBARBITAL 32.4 MG/1
TABLET ORAL
Qty: 180 TAB | Refills: 0 | Status: SHIPPED | OUTPATIENT
Start: 2019-05-08 | End: 2021-06-24 | Stop reason: ALTCHOICE

## 2019-05-10 ENCOUNTER — TELEPHONE (OUTPATIENT)
Dept: NEUROLOGY | Age: 69
End: 2019-05-10

## 2019-05-10 DIAGNOSIS — I65.23 STENOSIS OF BOTH INTERNAL CAROTID ARTERIES: ICD-10-CM

## 2019-05-10 DIAGNOSIS — G40.209 LOCALIZATION-RELATED PARTIAL EPILEPSY WITH COMPLEX PARTIAL SEIZURES (HCC): ICD-10-CM

## 2019-05-10 DIAGNOSIS — G40.209 COMPLEX PARTIAL SEIZURE EVOLVING TO GENERALIZED SEIZURE (HCC): ICD-10-CM

## 2019-05-10 DIAGNOSIS — I63.39 CEREBRAL INFARCTION DUE TO THROMBOSIS OF OTHER CEREBRAL ARTERY (HCC): ICD-10-CM

## 2019-05-10 DIAGNOSIS — I69.959 HEMIPLEGIA OF DOMINANT SIDE AS LATE EFFECT FOLLOWING CEREBROVASCULAR DISEASE (HCC): ICD-10-CM

## 2019-05-10 DIAGNOSIS — Z86.718 HISTORY OF CEREBRAL THROMBOSIS: ICD-10-CM

## 2019-05-10 DIAGNOSIS — G40.209 PARTIAL EPILEPSY WITH IMPAIRMENT OF CONSCIOUSNESS (HCC): ICD-10-CM

## 2019-05-10 RX ORDER — PHENOBARBITAL 30 MG/1
30 TABLET ORAL 4 TIMES DAILY
Qty: 360 TAB | Refills: 5 | Status: SHIPPED | OUTPATIENT
Start: 2019-05-10 | End: 2019-08-19 | Stop reason: SDUPTHER

## 2019-05-10 NOTE — TELEPHONE ENCOUNTER
I called the pharmacy and clarified that the patient takes 30mg phenobarbital: 1 four times daily and not the 32.4. The 32.4 is more expensive for the patient so has been doing 30mg. Can you please refill the 30mg and take out the 32.4mg?     Called in per Dr Alexandra Brasher ok

## 2019-08-19 DIAGNOSIS — I69.959 HEMIPLEGIA OF DOMINANT SIDE AS LATE EFFECT FOLLOWING CEREBROVASCULAR DISEASE (HCC): ICD-10-CM

## 2019-08-19 DIAGNOSIS — I65.23 STENOSIS OF BOTH INTERNAL CAROTID ARTERIES: ICD-10-CM

## 2019-08-19 DIAGNOSIS — Z86.718 HISTORY OF CEREBRAL THROMBOSIS: ICD-10-CM

## 2019-08-19 DIAGNOSIS — G40.209 COMPLEX PARTIAL SEIZURE EVOLVING TO GENERALIZED SEIZURE (HCC): ICD-10-CM

## 2019-08-19 DIAGNOSIS — I63.39 CEREBRAL INFARCTION DUE TO THROMBOSIS OF OTHER CEREBRAL ARTERY (HCC): ICD-10-CM

## 2019-08-19 DIAGNOSIS — G40.209 PARTIAL EPILEPSY WITH IMPAIRMENT OF CONSCIOUSNESS (HCC): ICD-10-CM

## 2019-08-19 DIAGNOSIS — G40.209 LOCALIZATION-RELATED PARTIAL EPILEPSY WITH COMPLEX PARTIAL SEIZURES (HCC): ICD-10-CM

## 2019-08-19 RX ORDER — PHENOBARBITAL 30 MG/1
TABLET ORAL
Qty: 180 TAB | Refills: 0 | Status: SHIPPED | OUTPATIENT
Start: 2019-08-19 | End: 2019-08-20 | Stop reason: SDUPTHER

## 2019-08-20 DIAGNOSIS — Z86.718 HISTORY OF CEREBRAL THROMBOSIS: ICD-10-CM

## 2019-08-20 DIAGNOSIS — I65.23 STENOSIS OF BOTH INTERNAL CAROTID ARTERIES: ICD-10-CM

## 2019-08-20 DIAGNOSIS — G40.209 COMPLEX PARTIAL SEIZURE EVOLVING TO GENERALIZED SEIZURE (HCC): ICD-10-CM

## 2019-08-20 DIAGNOSIS — I69.959 HEMIPLEGIA OF DOMINANT SIDE AS LATE EFFECT FOLLOWING CEREBROVASCULAR DISEASE (HCC): ICD-10-CM

## 2019-08-20 DIAGNOSIS — G40.209 PARTIAL EPILEPSY WITH IMPAIRMENT OF CONSCIOUSNESS (HCC): ICD-10-CM

## 2019-08-20 DIAGNOSIS — I63.39 CEREBRAL INFARCTION DUE TO THROMBOSIS OF OTHER CEREBRAL ARTERY (HCC): ICD-10-CM

## 2019-08-20 DIAGNOSIS — G40.209 LOCALIZATION-RELATED PARTIAL EPILEPSY WITH COMPLEX PARTIAL SEIZURES (HCC): ICD-10-CM

## 2019-08-20 NOTE — TELEPHONE ENCOUNTER
Future Appointments   Date Time Provider Benedicto Duenas   8/27/2019  9:40 AM Fiona Briseno  [address]   9/18/2019  9:30 AM Drew Alba  [facility]                         Last Appointment My Department:  7/11/18    Please advise of refill below. Requested Prescriptions     Pending Prescriptions Disp Refills    DILANTIN EXTENDED 100 mg ER capsule 270 Cap 4     Sig: Take 1 Cap by mouth three (3) times daily.  GISELA

## 2019-08-21 RX ORDER — PHENYTOIN SODIUM 100 MG/1
100 CAPSULE, EXTENDED RELEASE ORAL 3 TIMES DAILY
Qty: 270 CAP | Refills: 4 | Status: SHIPPED | OUTPATIENT
Start: 2019-08-21 | End: 2019-09-18 | Stop reason: SDUPTHER

## 2019-08-21 RX ORDER — PHENOBARBITAL 30 MG/1
TABLET ORAL
Qty: 180 TAB | Refills: 0 | Status: SHIPPED | OUTPATIENT
Start: 2019-08-21 | End: 2019-09-18 | Stop reason: SDUPTHER

## 2019-09-18 ENCOUNTER — OFFICE VISIT (OUTPATIENT)
Dept: NEUROLOGY | Age: 69
End: 2019-09-18

## 2019-09-18 ENCOUNTER — DOCUMENTATION ONLY (OUTPATIENT)
Dept: NEUROLOGY | Age: 69
End: 2019-09-18

## 2019-09-18 VITALS — RESPIRATION RATE: 16 BRPM | OXYGEN SATURATION: 94 % | BODY MASS INDEX: 53.14 KG/M2 | HEIGHT: 71 IN

## 2019-09-18 DIAGNOSIS — G40.209 PARTIAL EPILEPSY WITH IMPAIRMENT OF CONSCIOUSNESS (HCC): ICD-10-CM

## 2019-09-18 DIAGNOSIS — G40.209 COMPLEX PARTIAL SEIZURE EVOLVING TO GENERALIZED SEIZURE (HCC): ICD-10-CM

## 2019-09-18 DIAGNOSIS — I65.23 STENOSIS OF BOTH INTERNAL CAROTID ARTERIES: ICD-10-CM

## 2019-09-18 DIAGNOSIS — I63.39 CEREBRAL INFARCTION DUE TO THROMBOSIS OF OTHER CEREBRAL ARTERY (HCC): ICD-10-CM

## 2019-09-18 DIAGNOSIS — Z86.718 HISTORY OF CEREBRAL THROMBOSIS: ICD-10-CM

## 2019-09-18 DIAGNOSIS — I69.959 HEMIPLEGIA OF DOMINANT SIDE AS LATE EFFECT FOLLOWING CEREBROVASCULAR DISEASE (HCC): ICD-10-CM

## 2019-09-18 DIAGNOSIS — G40.209 LOCALIZATION-RELATED PARTIAL EPILEPSY WITH COMPLEX PARTIAL SEIZURES (HCC): Primary | ICD-10-CM

## 2019-09-18 RX ORDER — PHENYTOIN SODIUM 100 MG/1
100 CAPSULE, EXTENDED RELEASE ORAL 3 TIMES DAILY
Qty: 270 CAP | Refills: 4 | Status: SHIPPED | OUTPATIENT
Start: 2019-09-18 | End: 2020-09-23

## 2019-09-18 RX ORDER — PHENOBARBITAL 30 MG/1
TABLET ORAL
Qty: 360 TAB | Refills: 5 | Status: SHIPPED | OUTPATIENT
Start: 2019-09-18 | End: 2020-04-02

## 2019-09-18 NOTE — PATIENT INSTRUCTIONS
A Healthy Lifestyle: Care Instructions  Your Care Instructions    A healthy lifestyle can help you feel good, stay at a healthy weight, and have plenty of energy for both work and play. A healthy lifestyle is something you can share with your whole family. A healthy lifestyle also can lower your risk for serious health problems, such as high blood pressure, heart disease, and diabetes. You can follow a few steps listed below to improve your health and the health of your family. Follow-up care is a key part of your treatment and safety. Be sure to make and go to all appointments, and call your doctor if you are having problems. It's also a good idea to know your test results and keep a list of the medicines you take. How can you care for yourself at home? · Do not eat too much sugar, fat, or fast foods. You can still have dessert and treats now and then. The goal is moderation. · Start small to improve your eating habits. Pay attention to portion sizes, drink less juice and soda pop, and eat more fruits and vegetables. ? Eat a healthy amount of food. A 3-ounce serving of meat, for example, is about the size of a deck of cards. Fill the rest of your plate with vegetables and whole grains. ? Limit the amount of soda and sports drinks you have every day. Drink more water when you are thirsty. ? Eat at least 5 servings of fruits and vegetables every day. It may seem like a lot, but it is not hard to reach this goal. A serving or helping is 1 piece of fruit, 1 cup of vegetables, or 2 cups of leafy, raw vegetables. Have an apple or some carrot sticks as an afternoon snack instead of a candy bar. Try to have fruits and/or vegetables at every meal.  · Make exercise part of your daily routine. You may want to start with simple activities, such as walking, bicycling, or slow swimming. Try to be active 30 to 60 minutes every day. You do not need to do all 30 to 60 minutes all at once.  For example, you can exercise 3 times a day for 10 or 20 minutes. Moderate exercise is safe for most people, but it is always a good idea to talk to your doctor before starting an exercise program.  · Keep moving. Destinee Real the lawn, work in the garden, or [employer]. Take the stairs instead of the elevator at work. · If you smoke, quit. People who smoke have an increased risk for heart attack, stroke, cancer, and other lung illnesses. Quitting is hard, but there are ways to boost your chance of quitting tobacco for good. ? Use nicotine gum, patches, or lozenges. ? Ask your doctor about stop-smoking programs and medicines. ? Keep trying. In addition to reducing your risk of diseases in the future, you will notice some benefits soon after you stop using tobacco. If you have shortness of breath or asthma symptoms, they will likely get better within a few weeks after you quit. · Limit how much alcohol you drink. Moderate amounts of alcohol (up to 2 drinks a day for men, 1 drink a day for women) are okay. But drinking too much can lead to liver problems, high blood pressure, and other health problems. Family health  If you have a family, there are many things you can do together to improve your health. · Eat meals together as a family as often as possible. · Eat healthy foods. This includes fruits, vegetables, lean meats and dairy, and whole grains. · Include your family in your fitness plan. Most people think of activities such as jogging or tennis as the way to fitness, but there are many ways you and your family can be more active. Anything that makes you breathe hard and gets your heart pumping is exercise. Here are some tips:  ? Walk to do errands or to take your child to school or the bus.  ? Go for a family bike ride after dinner instead of watching TV. Where can you learn more? Go to http://josue-esau.info/. Enter E220 in the search box to learn more about \"A Healthy Lifestyle: Care Instructions. \"  Current as of: September 11, 2018  Content Version: 12.1  © 9957-7613 [employer], [employer]. Care instructions adapted under license by [employer] (which disclaims liability or warranty for this information). If you have questions about a medical condition or this instruction, always ask your healthcare professional. [address] any warranty or liability for your use of this information. Office Policies    o Phone calls/patient messages:  Please allow up to 24 hours for someone in the office to contact you about your call or message. Be mindful your provider may be out of the office or your message may require further review. We encourage you to use [employer] for your messages as this is a faster, more efficient way to communicate with our office    o Medication Refills:  Prescription medications require up to 48 business hours to process. We encourage you to use [employer] for your refills. For controlled medications: Please allow up to 72 business hours to process. Certain medications may require you to  a written prescription at our office. NO narcotic/controlled medications will be prescribed after 4pm Monday through Friday or on weekends    o Form/Paperwork Completion:  We ask that you allow 7-14 business days. You may also download your forms to [employer] to have your doctor print off.

## 2019-09-18 NOTE — PROGRESS NOTES
Consult    Subjective:     Paul Sanford is a 71 y. o.right handed Afro-American male seen today for evaluation at the request of Dr. Nirav Holley of new problem of increasing shortness of breath when he exerts himself, and I advised him he needs to talk to his PCP for this, most likely is some need to his cardiopulmonary disorder and possibly due to his weight. He has not had a real cardiac evaluation recently. Is on chronic Coumadin therapy because of his history of strokes and for his atrial fibrillation. The patient probably needs a cardiac evaluation as he said he is not had one in years. He is also seen for his stroke and seizures that occurred 20 years ago leaving him with mild right hemiparesis and seizures, and the patient has done well without recurring seizures or new medical problems other than asthma and shortness of breath that sounds a little bit more cardiogenic because he can only walk a short distance before he gets short of breath. He continues to take phenobarbital 30 mg 4 times a day and Dilantin 100 mg 3 times a day is a not had a seizure in 20 years. His last drug levels done 1 year ago were therapeutic and they will be continued. New prescription sent in for patient today. He had no recurrent strokelike symptoms. Patient had a carotid Doppler study done 1-1/2 years ago and did have inability to image the vertebral arteries last time, so we will repeat his Doppler again to make sure there is no progressive stenosis or cerebrovascular insufficiency. .  He's had no recurrent strokes but occasionally gets some lightheadedness and dizziness and a sensation of weakness. He has a mild right hemiparesis from his previous stroke 3 years ago. He continues on Coumadin for anticoagulation. He has a history of a clipped muscle in his left eye in the past for strabismus. His carotid Dopplers will be repeated on Thursday. Sara January  He's had no new focal weakness, sensory loss, visual changes, cognitive changes or any other new focal neurological symptoms. He had no recurrent strokelike symptoms, on chronic Coumadin therapy for his atrial fibrillation. He had no recurrent seizures, new focal weakness, sensory loss, visual changes, cognitive issues or other strokelike symptoms. His carotid Doppler showed less than 50% disease in the past. We will repeat those Dopplers in 1 weeks. He has significant degenerative arthritis and has to walk with a cane and move slowly cause of his arthritis. Patient was last seen 1 years ago and has remained stable     Patient's complete review of systems in symptoms was negative for any new medical problems palpitations or illnesses.     Past Medical History:   Diagnosis Date    Arthritis     Essential hypertension     GERD (gastroesophageal reflux disease)     Gout     Hypertension     Morbid obesity ([facility] [address]     Persistent dry cough     Seizures (HCC)     NONE IN 25 YR    Sleep apnea     USES CPAP    Unspecified cerebral artery occlusion with cerebral infarction     taking coumadin    Wheezing       Past Surgical History:   Procedure Laterality Date    HX GI      COLONOSCOPY/EGD    HX HEENT      \"clipped muscle\" in left eye     Family History   Problem Relation Age of Onset    Hypertension Mother     Other Mother         cerebral aneurysm    Stroke Father     Hypertension Sister     Hypertension Child     Asthma Neg Hx     Cancer Neg Hx     Diabetes Neg Hx     Heart Disease Neg Hx     Malignant Hyperthermia Neg Hx     Pseudocholinesterase Deficiency Neg Hx     Delayed Awakening Neg Hx     Post-op Nausea/Vomiting Neg Hx       Social History     Tobacco Use    Smoking status: Former Smoker     Years: 30.00     Last attempt to quit: 11/3/2001     Years since quittin.8    Smokeless tobacco: Never Used    Tobacco comment: SMOKED CIGARS   Substance Use Topics    Alcohol use: No       Current Outpatient Medications   Medication Sig Dispense Refill    PHENobarbital (LUMINAL) 30 mg tablet TAKE ONE TABLET BY MOUTH FOUR TIMES A  Tab 5    DILANTIN EXTENDED 100 mg ER capsule Take 1 Cap by mouth three (3) times daily. GISELA 270 Cap 4    PHENobarbital (LUMINAL) 32.4 mg tablet TAKE ONE TABLET BY MOUTH FOUR TIMES A  Tab 0    atorvastatin (LIPITOR) 20 mg tablet Take  by mouth daily.  ALBUTEROL SULFATE (VENTOLIN HFA IN) Take  by inhalation.  cpap machine kit by Does Not Apply route.  warfarin (COUMADIN) 2.5 mg tablet Take 2.5 mg by mouth as directed. Take 2.5 mg (1 tablet) every Monday. Take 5 mg (2 tablets) Tuesday through Sunday.  fluticasone-salmeterol (ADVAIR DISKUS) 500-50 mcg/dose diskus inhaler Take 1 Puff by inhalation every twelve (12) hours.  allopurinol (ZYLOPRIM) 300 mg tablet Take  by mouth daily.  BENEFIBER, GUAR GUM, PO Take  by mouth.  valsartan-hydrochlorothiazide (DIOVAN HCT) 160-25 mg per tablet Take 1 Tab by mouth daily.  folic acid (FOLVITE) 1 mg tablet Take  by mouth daily.  ipratropium (ATROVENT) 0.06 % nasal spray 2 Sprays four (4) times daily.  pantoprazole (PROTONIX) 40 mg tablet Take 40 mg by mouth daily.  FERROUS SULFATE (SLOW FE PO) Take  by mouth.  cholecalciferol (VITAMIN D3) 1,000 unit tablet Take  by mouth daily.           Allergies   Allergen Reactions    Augmentin [Amoxicillin-Pot Clavulanate] Hives    Keflex [Cephalexin] Hives    Pcn [Penicillins] Hives        Review of Systems:  A comprehensive review of systems was negative except for: Cardiovascular: positive for chest pressure/discomfort, dyspnea, palpitations, irregular heart beats, exertional chest pressure/discomfort, atrial fibrillation  Musculoskeletal: positive for myalgias, arthralgias, stiff joints, back pain and muscle weakness  Neurological: positive for paresthesia, coordination problems, gait problems and weakness  Behvioral/Psych: positive for anxiety and depression     Objective: I      NEUROLOGICAL EXAM:    Appearance: The patient is well developed, well nourished,  Obese, provides a coherent history and is in no acute distress. Mental Status: Oriented to place and person, and the president, but not the date, and does seem a little slow mentally and has some memory problems, but speech is fluent without aphasia or dysarthria. Mood and affect slow. Cranial Nerves:   Intact visual fields. Fundi are benign, discs are flat but fundi are poorly seen. Yeimi MELGAR, EOM's full, no nystagmus, no ptosis. Facial sensation is normal. Corneal reflexes are not tested. Facial movement is symmetric. Hearing is normal bilaterally. Palate is midline with normal sternocleidomastoid and trapezius muscles are normal. Tongue is midline. Neck without meningismus or bruits  Temporal arteries are not tender or enlarged   Motor:  4/5 strength in upper and lower proximal and distal muscles, the patient probably had a minimal right hemiparesis. Normal bulk and tone. No fasciculations. Rapid altering movement is slow bilaterally, slightly worse on the right side   Reflexes:   Deep tendon reflexes 1+/4 and symmetrical. No clear Babinski or clonus present   Sensory:   Normal to touch, pinprick and vibration mildly decreased in both lower extremities. DSS is intact   Gait:  Abnormal gait, as patient has to walk with a cane and move slowly due to severe arthritis and back and knees. Tremor:   No tremor noted. Cerebellar:  No cerebellar signs present on finger-nose-finger examination, but Romberg and tandem are moderately severely abnormal.   Neurovascular:  Abnormal heart sounds and irregular rhythm, peripheral pulses decreased in both feet, and no carotid bruits.            Assessment:     Plan:     New problem of increasing shortness of breath, have recommended he see his PCP to get cardiac evaluation and possible pulmonary evaluation for this increasing shortness of breath was is probably aggravated his underlying underlying cardiac condition and obesity. Patient to get repeat carotid Doppler studies done since been 1 year since his last study and continue his anticoagulation as he is doing well on that. Patient without seizures on Dilantin 300 mg a day taking 100 mg 3 times a day, phenobarbital 32.4 mg 4 times a day. His last drug levels were therapeutic so they would not be checked today. Patient with previous stroke, on Coumadin atrial fibrillation, without recurrent neurologic symptoms, will repeat his carotid Doppler study since been more than 2 years we could not image his vertebral arteries last time to make sure there is no vertebrobasilar insufficiency  Patient without seizures for 20 years, we will recheck his levels of his medications, and all of his medications refilled for the patient today for his seizures. He was counseled about his stroke, I think he needs to control his blood pressure, his blood sugars, not to smoke which he does not, and to control his cholesterol his PCP is managing that and he seems to be doing well from that standpoint. We will recheck his levels of his medications to make sure they have not dropped to low in a near the therapeutic range. We discussed the condition with the patient and his wife in detail, and they are happy with his current treatment and medical status, and want to just continue the current dose of medications which we will do. Continue current medications. Check carotid Dopplers in 2 days  I reviewed his carotid Doppler studies personally on the PACS system myself done 2 years ago and decided he indeed needs another Doppler  His levels of his anticonvulsants were relatively normal one year ago  He is to try to exercise regularly, lose weight, and take his vitamins daily  Followup in one years time or earlier if needed. Patient condition discuss with patient and his wife in detail.  They agree with current plans in therapy    Signed By: Melynda Duane, MD     September 18, 2019         This note will not be viewable in [address]

## 2019-12-03 ENCOUNTER — DOCUMENTATION ONLY (OUTPATIENT)
Dept: SLEEP MEDICINE | Age: 69
End: 2019-12-03

## 2019-12-03 ENCOUNTER — OFFICE VISIT (OUTPATIENT)
Dept: SLEEP MEDICINE | Age: 69
End: 2019-12-03

## 2019-12-03 VITALS
HEIGHT: 71 IN | HEART RATE: 97 BPM | OXYGEN SATURATION: 98 % | RESPIRATION RATE: 18 BRPM | WEIGHT: 315 LBS | DIASTOLIC BLOOD PRESSURE: 80 MMHG | SYSTOLIC BLOOD PRESSURE: 171 MMHG | BODY MASS INDEX: 44.1 KG/M2

## 2019-12-03 DIAGNOSIS — E66.01 OBESITY, MORBID (HCC): ICD-10-CM

## 2019-12-03 DIAGNOSIS — G47.33 OBSTRUCTIVE SLEEP APNEA (ADULT) (PEDIATRIC): Primary | ICD-10-CM

## 2019-12-03 DIAGNOSIS — I48.91 ATRIAL FIBRILLATION, UNSPECIFIED TYPE (HCC): ICD-10-CM

## 2019-12-03 NOTE — Clinical Note
I changed order and included seizure precautions (can you link study to new order so tech sees seizure precautions?)ty

## 2019-12-03 NOTE — PROGRESS NOTES
[address] [facility] [place] [place], [address]  Tel.  [phone]  Fax. [address] [facility] [address]  [place], [address] [facility]  Tel.  [phone]  Fax. [phone] [address]  Tel.  [phone]  Fax. [phone]         Subjective:      [address] is an 71 y.o. male self-referred for evaluation for a sleep disorder. He complains of needing a new sleep doctor associated with history of sleep apnea. Sleep study done in early 2000s, he believes around the time he had a stroke. Symptoms began several months ago, unchanged since that time. He usually can fall asleep in a few minutes. Family or house members note no obvious snoring with PAP in place. He denies falling asleep while during conversation. Luana Constantino Sr. does not wake up frequently at night. He is not bothered by waking up too early and left unable to get back to sleep. He actually sleeps about 6 hours at night and wakes up about 1 times during the night. He does not work shifts: Justyn Pastor indicates he does not get too little sleep at night. His bedtime is 1130. He awakens at 0630. He does not take naps. . He has the following observed behaviors: Loud snoring, Light snoring;  . Other remarks:   he has been having shortness of breath and is having all sorts of tests done. Old records not available today. Download from PAP machine reviewed. Brand: resmed S9  Setting 12 cm H20  Estimated AHI 2/hour  Adherence 100% over past 1 month(s)    he was diagnosed with sleep apnea 10+ years ago. he was started on CPAP at a setting of 12 cm H20.  he has been using it regularly.     Old records not available  [place] Sleepiness Score: 0    Allergies   Allergen Reactions    Augmentin [Amoxicillin-Pot Clavulanate] Hives    Keflex [Cephalexin] Hives    Pcn [Penicillins] Hives         Current Outpatient Medications:     PHENobarbital (LUMINAL) 30 mg tablet, TAKE ONE TABLET BY MOUTH FOUR TIMES A DAY, Disp: 360 Tab, Rfl: 5    DILANTIN EXTENDED 100 mg ER capsule, Take 1 Cap by mouth three (3) times daily. GISELA, Disp: 270 Cap, Rfl: 4    PHENobarbital (LUMINAL) 32.4 mg tablet, TAKE ONE TABLET BY MOUTH FOUR TIMES A DAY, Disp: 180 Tab, Rfl: 0    atorvastatin (LIPITOR) 20 mg tablet, Take  by mouth daily. , Disp: , Rfl:     ALBUTEROL SULFATE (VENTOLIN HFA IN), Take  by inhalation. , Disp: , Rfl:     cpap machine kit, by Does Not Apply route., Disp: , Rfl:     warfarin (COUMADIN) 2.5 mg tablet, Take 2.5 mg by mouth as directed. Take 2.5 mg (1 tablet) every Monday. Take 5 mg (2 tablets) Tuesday through Sunday. , Disp: , Rfl:     fluticasone-salmeterol (ADVAIR DISKUS) 500-50 mcg/dose diskus inhaler, Take 1 Puff by inhalation every twelve (12) hours. , Disp: , Rfl:     allopurinol (ZYLOPRIM) 300 mg tablet, Take  by mouth daily. , Disp: , Rfl:     BENEFIBER, GUAR GUM, PO, Take  by mouth., Disp: , Rfl:     valsartan-hydrochlorothiazide (DIOVAN HCT) 160-25 mg per tablet, Take 1 Tab by mouth daily. , Disp: , Rfl:     folic acid (FOLVITE) 1 mg tablet, Take  by mouth daily. , Disp: , Rfl:     ipratropium (ATROVENT) 0.06 % nasal spray, 2 Sprays four (4) times daily. , Disp: , Rfl:     pantoprazole (PROTONIX) 40 mg tablet, Take 40 mg by mouth daily. , Disp: , Rfl:     FERROUS SULFATE (SLOW FE PO), Take  by mouth., Disp: , Rfl:     cholecalciferol (VITAMIN D3) (1000 Units /25 mcg) tablet, Take  by mouth daily. , Disp: , Rfl:      He  has a past medical history of Arthritis, Essential hypertension, GERD (gastroesophageal reflux disease), Gout, Hypertension, Morbid obesity ([address] Persistent dry cough, Seizures ([address] Sleep apnea, Unspecified cerebral artery occlusion with cerebral infarction, and Wheezing. He  has a past surgical history that includes hx heent and hx gi. He family history includes Hypertension in his child, mother, and sister; Other in his mother; Stroke in his father.     He  reports that he quit smoking about 18 years ago. He quit after 30.00 years of use. He has never used smokeless tobacco. He reports that he does not drink alcohol. Review of Systems:  Constitutional: 20 pound weight gain. Eyes:  No blurred vision. CVS:  No significant chest pain  Pulm:  + significant shortness of breath  GI:  No significant nausea or vomiting  :  No significant nocturia  Musculoskeletal:  Some joint pain at night  Skin:  No significant rashes  Neuro:  No significant dizziness   Psych:  No active mood issues    Sleep Review of Systems: notable for no difficulty falling asleep; infrequent awakenings at night;  absent dreaming noted; no nightmares ; no early morning headaches; no memory problems; no concentration issues; no history of any automobile or occupational accidents due to daytime drowsiness. Objective:     Visit Vitals  /80 (BP 1 Location: Right arm, BP Patient Position: Sitting)   Pulse 97   Resp 18   Ht 5' 11\" (1.803 m)   Wt (!) 400 lb 4.8 oz (181.6 kg)   SpO2 98%   BMI 55.83 kg/m²         General:   Not in acute distress   Eyes:  Anicteric sclerae, no obvious strabismus   Nose:  No obvious nasal septum deviation    Oropharynx:   Class 4 oropharyngeal outlet, thick tongue base, enlarged and boggy uvula, low-lying soft palate, narrow tonsilo-pharyngeal pilars   Tonsils:   tonsils are present and normal   Neck:   Neck circ. in \"inches\": 19; midline trachea   Chest/Lungs:  Equal lung expansion, clear on auscultation    CVS:  Normal rate, regular rhythm; no JVD   Skin:  Warm to touch; no obvious rashes   Neuro:  No focal deficits ; no obvious tremor    Psych:  Normal affect,  normal countenance;          Assessment:       ICD-10-CM ICD-9-CM    1. Obstructive sleep apnea (adult) (pediatric) G47.33 327.23 SPLIT CPAP/PSG      AMB SUPPLY ORDER   2. Atrial fibrillation, unspecified type ([address] I48.91 427.31    3.  Obesity, morbid ([address] E66.01 278.01          Plan:     * The patient currently has a High Risk for having sleep apnea. STOP-BANG score 8.  * Split Night PSG was ordered for initial evaluation. We will follow the American Academy of Sleep Medicine protocol regarding split-night procedures and offering a trial of Positive Airway Pressure (CPAP, BPAP, etc.). seizure precautions  * He was provided information on sleep apnea including coresponding risk factors and the importance of proper treatment. * Counseling was provided regarding proper sleep hygiene and safe driving. Treatment options for sleep apnea were reviewed. he is not against a trial of PAP if found to have significant sleep apnea. He is interested in new machine. We will obtain previous records if available. The treatment plan was reviewed with the patient in detail and reviewed with the patient and the lead technologist. he understands that the lead technologist will be calling him  with the results and assisting with the next step in the treatment plan as outlined today during the consultation with me. All of his questions were addressed. 2. . Atrial fibrillation - he continues on his current regimen. I have reviewed the relationship between arrhythmias and sleep disordered breathing  3. Obesity - I have counseled the patient regarding the benefits of weight loss.       Electronically signed by    Donald Singh MD  Diplomate in Sleep Medicine  JUDY

## 2019-12-03 NOTE — PATIENT INSTRUCTIONS
217 [facility] [place]. [place], [address]  Tel.  [phone]  Fax. 100 [facility] 60  [place], [address]  Tel.  [phone]  Fax. [phone] [address]  Tel.  [phone]  Fax. [phone]     PROPER SLEEP HYGIENE    What to avoid  · Do not have drinks with caffeine, such as coffee or black tea, for 8 hours before bed. · Do not smoke or use other types of tobacco near bedtime. Nicotine is a stimulant and can keep you awake. · Avoid drinking alcohol late in the evening, because it can cause you to wake in the middle of the night. · Do not eat a big meal close to bedtime. If you are hungry, eat a light snack. · Do not drink a lot of water close to bedtime, because the need to urinate may wake you up during the night. · Do not read or watch TV in bed. Use the bed only for sleeping and sexual activity. What to try  · Go to bed at the same time every night, and wake up at the same time every morning. Do not take naps during the day. · Keep your bedroom quiet, dark, and cool. · Get regular exercise, but not within 3 to 4 hours of your bedtime. .  · Sleep on a comfortable pillow and mattress. · If watching the clock makes you anxious, turn it facing away from you so you cannot see the time. · If you worry when you lie down, start a worry book. Well before bedtime, write down your worries, and then set the book and your concerns aside. · Try meditation or other relaxation techniques before you go to bed. · If you cannot fall asleep, get up and go to another room until you feel sleepy. Do something relaxing. Repeat your bedtime routine before you go to bed again. · Make your house quiet and calm about an hour before bedtime. Turn down the lights, turn off the TV, log off the computer, and turn down the volume on music. This can help you relax after a busy day.     Drowsy Driving  The [address] [place] [address] Road Traffic Safety Administration cites drowsiness as a causing factor in more than 655,048 police reported crashes annually, resulting in 76,000 injuries and 1,500 deaths. Other surveys suggest 55% of people polled have driven while drowsy in the past year, 23% had fallen asleep but not crashed, 3% crashed, and 2% had and accident due to drowsy driving. Who is at risk? Young Drivers: One study of drowsy driving accidents states that 55% of the drivers were under 25 years. Of those, 75% were male. Shift Workers and Travelers: People who work overnight or travel across time zones frequently are at higher risk of experiencing Circadian Rhythm Disorders. They are trying to work and function when their body is programed to sleep. Sleep Deprived: Lack of sleep has a serious impact on your ability to pay attention or focus on a task. Consistently getting less than the average of 8 hours your body needs creates partial or cumulative sleep deprivation. Untreated Sleep Disorders: Sleep Apnea, Narcolepsy, R.L.S., and other sleep disorders (untreated) prevent a person from getting enough restful sleep. This leads to excessive daytime sleepiness and increases the risk for drowsy driving accidents by up to 7 times. Medications / Alcohol: Even over the counter medications can cause drowsiness. Medications that impair a drivers attention should have a warning label. Alcohol naturally makes you sleepy and on its own can cause accidents. Combined with excessive drowsiness its effects are amplified. Signs of Drowsy Driving:   * You don't remember driving the last few miles   * You may drift out of your selene   * You are unable to focus and your thoughts wander   * You may yawn more often than normal   * You have difficulty keeping your eyes open / nodding off   * Missing traffic signs, speeding, or tailgating  Prevention-   Good sleep hygiene, lifestyle and behavioral choices have the most impact on drowsy driving.  There is no substitute for sleep and the average person requires 8 hours nightly. If you find yourself driving drowsy, stop and sleep. Consider the sleep hygiene tips provided during your visit as well. Medication Refill Policy: Refills for all medications require 1 week advance notice. Please have your pharmacy fax a refill request. We are unable to fax, or call in \"controled substance\" medications and you will need to pick these prescriptions up from our office. Aprilage Activation    Thank you for requesting access to Aprilage. Please follow the instructions below to securely access and download your online medical record. Aprilage allows you to send messages to your doctor, view your test results, renew your prescriptions, schedule appointments, and more. How Do I Sign Up? 1. In your internet browser, go to [email or web address]. 2. Click on the First Time User? Click Here link in the Sign In box. You will see the New Member Sign Up page. 3. Enter your Aprilage Access Code exactly as it appears below. You will not need to use this code after youve completed the sign-up process. If you do not sign up before the expiration date, you must request a new code. Aprilage Access Code: [email or web address]  Expires: 2020  9:15 AM (This is the date your Aprilage access code will )    4. Enter the last four digits of your Social Security Number (xxxx) and Date of Birth (mm/dd/yyyy) as indicated and click Submit. You will be taken to the next sign-up page. 5. Create a Aprilage ID. This will be your Aprilage login ID and cannot be changed, so think of one that is secure and easy to remember. 6. Create a Aprilage password. You can change your password at any time. 7. Enter your Password Reset Question and Answer. This can be used at a later time if you forget your password. 8. Enter your e-mail address. You will receive e-mail notification when new information is available in [address] 9. Click Sign Up.  You can now view and download portions of your medical record. 10. Click the Download Summary menu link to download a portable copy of your medical information. Additional Information    If you have questions, please call [phone]. Remember, [employer] is NOT to be used for urgent needs. For medical emergencies, dial 911.

## 2019-12-05 ENCOUNTER — DOCUMENTATION ONLY (OUTPATIENT)
Dept: SLEEP MEDICINE | Age: 69
End: 2019-12-05

## 2020-03-27 ENCOUNTER — DOCUMENTATION ONLY (OUTPATIENT)
Dept: SLEEP MEDICINE | Age: 70
End: 2020-03-27

## 2020-04-02 DIAGNOSIS — G40.209 PARTIAL EPILEPSY WITH IMPAIRMENT OF CONSCIOUSNESS (HCC): ICD-10-CM

## 2020-04-02 DIAGNOSIS — Z86.718 HISTORY OF CEREBRAL THROMBOSIS: ICD-10-CM

## 2020-04-02 DIAGNOSIS — I65.23 STENOSIS OF BOTH INTERNAL CAROTID ARTERIES: ICD-10-CM

## 2020-04-02 DIAGNOSIS — I69.959 HEMIPLEGIA OF DOMINANT SIDE AS LATE EFFECT FOLLOWING CEREBROVASCULAR DISEASE (HCC): ICD-10-CM

## 2020-04-02 DIAGNOSIS — G40.209 LOCALIZATION-RELATED PARTIAL EPILEPSY WITH COMPLEX PARTIAL SEIZURES (HCC): ICD-10-CM

## 2020-04-02 DIAGNOSIS — I63.39 CEREBRAL INFARCTION DUE TO THROMBOSIS OF OTHER CEREBRAL ARTERY (HCC): ICD-10-CM

## 2020-04-02 DIAGNOSIS — G40.209 COMPLEX PARTIAL SEIZURE EVOLVING TO GENERALIZED SEIZURE (HCC): ICD-10-CM

## 2020-04-02 RX ORDER — PHENOBARBITAL 30 MG/1
TABLET ORAL
Qty: 360 TAB | Refills: 0 | Status: SHIPPED | OUTPATIENT
Start: 2020-04-02 | End: 2020-09-23

## 2020-04-16 ENCOUNTER — TELEPHONE (OUTPATIENT)
Dept: SLEEP MEDICINE | Age: 70
End: 2020-04-16

## 2020-04-16 DIAGNOSIS — G47.33 OBSTRUCTIVE SLEEP APNEA (ADULT) (PEDIATRIC): Primary | ICD-10-CM

## 2020-04-16 NOTE — TELEPHONE ENCOUNTER
Patient's wife called into the office stating that the patient needs an order for a new Cpap device his machine is malfunctioning. Please put order in system to sent to [facility]. Thanks!

## 2020-04-17 ENCOUNTER — DOCUMENTATION ONLY (OUTPATIENT)
Dept: SLEEP MEDICINE | Age: 70
End: 2020-04-17

## 2020-09-23 DIAGNOSIS — I65.23 STENOSIS OF BOTH INTERNAL CAROTID ARTERIES: ICD-10-CM

## 2020-09-23 DIAGNOSIS — G40.209 COMPLEX PARTIAL SEIZURE EVOLVING TO GENERALIZED SEIZURE (HCC): ICD-10-CM

## 2020-09-23 DIAGNOSIS — Z86.718 HISTORY OF CEREBRAL THROMBOSIS: ICD-10-CM

## 2020-09-23 DIAGNOSIS — I63.39 CEREBRAL INFARCTION DUE TO THROMBOSIS OF OTHER CEREBRAL ARTERY (HCC): ICD-10-CM

## 2020-09-23 DIAGNOSIS — G40.209 LOCALIZATION-RELATED PARTIAL EPILEPSY WITH COMPLEX PARTIAL SEIZURES (HCC): ICD-10-CM

## 2020-09-23 DIAGNOSIS — I69.959 HEMIPLEGIA OF DOMINANT SIDE AS LATE EFFECT FOLLOWING CEREBROVASCULAR DISEASE (HCC): ICD-10-CM

## 2020-09-23 DIAGNOSIS — G40.209 PARTIAL EPILEPSY WITH IMPAIRMENT OF CONSCIOUSNESS (HCC): ICD-10-CM

## 2020-09-23 RX ORDER — PHENYTOIN SODIUM 100 MG/1
CAPSULE, EXTENDED RELEASE ORAL
Qty: 135 CAP | Refills: 0 | Status: SHIPPED | OUTPATIENT
Start: 2020-09-23 | End: 2020-12-22 | Stop reason: SDUPTHER

## 2020-09-23 RX ORDER — PHENOBARBITAL 30 MG/1
TABLET ORAL
Qty: 360 TAB | Refills: 0 | Status: SHIPPED | OUTPATIENT
Start: 2020-09-23 | End: 2020-12-16

## 2020-12-16 DIAGNOSIS — Z86.718 HISTORY OF CEREBRAL THROMBOSIS: ICD-10-CM

## 2020-12-16 DIAGNOSIS — G40.209 LOCALIZATION-RELATED PARTIAL EPILEPSY WITH COMPLEX PARTIAL SEIZURES (HCC): ICD-10-CM

## 2020-12-16 DIAGNOSIS — G40.209 PARTIAL EPILEPSY WITH IMPAIRMENT OF CONSCIOUSNESS (HCC): ICD-10-CM

## 2020-12-16 DIAGNOSIS — I69.959 HEMIPLEGIA OF DOMINANT SIDE AS LATE EFFECT FOLLOWING CEREBROVASCULAR DISEASE (HCC): ICD-10-CM

## 2020-12-16 DIAGNOSIS — I65.23 STENOSIS OF BOTH INTERNAL CAROTID ARTERIES: ICD-10-CM

## 2020-12-16 DIAGNOSIS — G40.209 COMPLEX PARTIAL SEIZURE EVOLVING TO GENERALIZED SEIZURE (HCC): ICD-10-CM

## 2020-12-16 DIAGNOSIS — I63.39 CEREBRAL INFARCTION DUE TO THROMBOSIS OF OTHER CEREBRAL ARTERY (HCC): ICD-10-CM

## 2020-12-16 RX ORDER — PHENOBARBITAL 30 MG/1
TABLET ORAL
Qty: 360 TAB | Refills: 0 | Status: SHIPPED | OUTPATIENT
Start: 2020-12-16 | End: 2021-03-09

## 2020-12-17 ENCOUNTER — TELEPHONE (OUTPATIENT)
Dept: NEUROLOGY | Age: 70
End: 2020-12-17

## 2020-12-17 NOTE — TELEPHONE ENCOUNTER
----- Message from [facility] sent at 12/17/2020  9:12 AM EST -----  Regarding: Dr. Angela Geiger  Medication Refill    Caller (if not patient):  Ishan Mealing      Relationship of caller (if not patient):  Spouse      Best contact number(s): [phone]      Name of medication and dosage if known:  Dilantin, 100mg       Is patient out of this medication (yes/no): N      Pharmacy name:   [facility] [address] listed in chart? (yes/no): Y    Pharmacy phone number:  [phone]    Details to clarify the request:  Patient is not out yet, but they would like the refill request sent over so it is there when he's ready to pick it up. He will be out of this medication next week.       [facility]

## 2020-12-17 NOTE — TELEPHONE ENCOUNTER
----- Message from [facility] sent at 12/17/2020  9:17 AM EST -----  Regarding: Dr. Pavel Rain (if not patient): Chad Calvillo       Relationship of caller (if not patient):  Spouse       Best contact number(s): [phone]       Name of medication and dosage if known:  Phenobarbital, 30mg       Is patient out of this medication (yes/no): N       Pharmacy name:   [address] [place] [address] listed in chart? (yes/no): [address] phone number:  [phone]     Details to clarify the request: Tiera Dexter picked up prescription recently, but there are no refills. They want the refill ready to go for when he needs it next time so it is already there when he needs it.     [facility] (4) excellent

## 2020-12-22 DIAGNOSIS — Z86.718 HISTORY OF CEREBRAL THROMBOSIS: ICD-10-CM

## 2020-12-22 DIAGNOSIS — I65.23 STENOSIS OF BOTH INTERNAL CAROTID ARTERIES: ICD-10-CM

## 2020-12-22 DIAGNOSIS — I69.959 HEMIPLEGIA OF DOMINANT SIDE AS LATE EFFECT FOLLOWING CEREBROVASCULAR DISEASE (HCC): ICD-10-CM

## 2020-12-22 DIAGNOSIS — G40.209 COMPLEX PARTIAL SEIZURE EVOLVING TO GENERALIZED SEIZURE (HCC): ICD-10-CM

## 2020-12-22 DIAGNOSIS — I63.39 CEREBRAL INFARCTION DUE TO THROMBOSIS OF OTHER CEREBRAL ARTERY (HCC): ICD-10-CM

## 2020-12-22 DIAGNOSIS — G40.209 PARTIAL EPILEPSY WITH IMPAIRMENT OF CONSCIOUSNESS (HCC): ICD-10-CM

## 2020-12-22 DIAGNOSIS — G40.209 LOCALIZATION-RELATED PARTIAL EPILEPSY WITH COMPLEX PARTIAL SEIZURES (HCC): ICD-10-CM

## 2020-12-22 RX ORDER — PHENYTOIN SODIUM 100 MG/1
CAPSULE, EXTENDED RELEASE ORAL
Qty: 100 CAP | Refills: 11 | Status: SHIPPED | OUTPATIENT
Start: 2020-12-22 | End: 2022-01-11

## 2020-12-22 NOTE — TELEPHONE ENCOUNTER
Last appointment:  9/18/2019  Next appointment:  3/11/2021    Requesting:   Dilantin    Please send in if warranted.

## 2021-03-09 DIAGNOSIS — G40.209 PARTIAL EPILEPSY WITH IMPAIRMENT OF CONSCIOUSNESS (HCC): ICD-10-CM

## 2021-03-09 DIAGNOSIS — I69.959 HEMIPLEGIA OF DOMINANT SIDE AS LATE EFFECT FOLLOWING CEREBROVASCULAR DISEASE (HCC): ICD-10-CM

## 2021-03-09 DIAGNOSIS — G40.209 LOCALIZATION-RELATED PARTIAL EPILEPSY WITH COMPLEX PARTIAL SEIZURES (HCC): ICD-10-CM

## 2021-03-09 DIAGNOSIS — G40.209 COMPLEX PARTIAL SEIZURE EVOLVING TO GENERALIZED SEIZURE (HCC): ICD-10-CM

## 2021-03-09 DIAGNOSIS — I65.23 STENOSIS OF BOTH INTERNAL CAROTID ARTERIES: ICD-10-CM

## 2021-03-09 DIAGNOSIS — Z86.718 HISTORY OF CEREBRAL THROMBOSIS: ICD-10-CM

## 2021-03-09 DIAGNOSIS — I63.39 CEREBRAL INFARCTION DUE TO THROMBOSIS OF OTHER CEREBRAL ARTERY (HCC): ICD-10-CM

## 2021-03-09 RX ORDER — PHENOBARBITAL 30 MG/1
TABLET ORAL
Qty: 360 TAB | Refills: 0 | Status: SHIPPED | OUTPATIENT
Start: 2021-03-09 | End: 2021-03-23

## 2021-03-23 DIAGNOSIS — I63.39 CEREBRAL INFARCTION DUE TO THROMBOSIS OF OTHER CEREBRAL ARTERY (HCC): ICD-10-CM

## 2021-03-23 DIAGNOSIS — G40.209 COMPLEX PARTIAL SEIZURE EVOLVING TO GENERALIZED SEIZURE (HCC): ICD-10-CM

## 2021-03-23 DIAGNOSIS — G40.209 PARTIAL EPILEPSY WITH IMPAIRMENT OF CONSCIOUSNESS (HCC): ICD-10-CM

## 2021-03-23 DIAGNOSIS — I69.959 HEMIPLEGIA OF DOMINANT SIDE AS LATE EFFECT FOLLOWING CEREBROVASCULAR DISEASE (HCC): ICD-10-CM

## 2021-03-23 DIAGNOSIS — Z86.718 HISTORY OF CEREBRAL THROMBOSIS: ICD-10-CM

## 2021-03-23 DIAGNOSIS — I65.23 STENOSIS OF BOTH INTERNAL CAROTID ARTERIES: ICD-10-CM

## 2021-03-23 DIAGNOSIS — G40.209 LOCALIZATION-RELATED PARTIAL EPILEPSY WITH COMPLEX PARTIAL SEIZURES (HCC): ICD-10-CM

## 2021-03-23 RX ORDER — PHENOBARBITAL 30 MG/1
TABLET ORAL
Qty: 360 TAB | Refills: 0 | Status: SHIPPED | OUTPATIENT
Start: 2021-03-23 | End: 2021-06-22

## 2021-04-19 ENCOUNTER — DOCUMENTATION ONLY (OUTPATIENT)
Dept: SLEEP MEDICINE | Age: 71
End: 2021-04-19

## 2021-04-20 ENCOUNTER — VIRTUAL VISIT (OUTPATIENT)
Dept: SLEEP MEDICINE | Age: 71
End: 2021-04-20
Payer: MEDICARE

## 2021-04-20 ENCOUNTER — DOCUMENTATION ONLY (OUTPATIENT)
Dept: SLEEP MEDICINE | Age: 71
End: 2021-04-20

## 2021-04-20 DIAGNOSIS — Z79.4 CONTROLLED TYPE 2 DIABETES MELLITUS WITHOUT COMPLICATION, WITH LONG-TERM CURRENT USE OF INSULIN (HCC): ICD-10-CM

## 2021-04-20 DIAGNOSIS — G47.33 OBSTRUCTIVE SLEEP APNEA (ADULT) (PEDIATRIC): Primary | ICD-10-CM

## 2021-04-20 DIAGNOSIS — E11.9 CONTROLLED TYPE 2 DIABETES MELLITUS WITHOUT COMPLICATION, WITH LONG-TERM CURRENT USE OF INSULIN (HCC): ICD-10-CM

## 2021-04-20 PROCEDURE — 99442 PR PHYS/QHP TELEPHONE EVALUATION 11-20 MIN: CPT | Performed by: INTERNAL MEDICINE

## 2021-04-20 NOTE — PATIENT INSTRUCTIONS
[address] [place] [facility], [address] Tel.  [phone] Fax. 100 [facility] 60 [place], [address] Tel.  [phone] Fax. [phone] 9250 [facility] [address] Tel.  [phone] Fax. [phone] PROPER SLEEP HYGIENE What to avoid · Do not have drinks with caffeine, such as coffee or black tea, for 8 hours before bed. · Do not smoke or use other types of tobacco near bedtime. Nicotine is a stimulant and can keep you awake. · Avoid drinking alcohol late in the evening, because it can cause you to wake in the middle of the night. · Do not eat a big meal close to bedtime. If you are hungry, eat a light snack. · Do not drink a lot of water close to bedtime, because the need to urinate may wake you up during the night. · Do not read or watch TV in bed. Use the bed only for sleeping and sexual activity. What to try · Go to bed at the same time every night, and wake up at the same time every morning. Do not take naps during the day. · Keep your bedroom quiet, dark, and cool. · Get regular exercise, but not within 3 to 4 hours of your bedtime. Gerldine Prost · Sleep on a comfortable pillow and mattress. · If watching the clock makes you anxious, turn it facing away from you so you cannot see the time. · If you worry when you lie down, start a worry book. Well before bedtime, write down your worries, and then set the book and your concerns aside. · Try meditation or other relaxation techniques before you go to bed. · If you cannot fall asleep, get up and go to another room until you feel sleepy. Do something relaxing. Repeat your bedtime routine before you go to bed again. · Make your house quiet and calm about an hour before bedtime. Turn down the lights, turn off the TV, log off the computer, and turn down the volume on music. This can help you relax after a busy day. Drowsy Driving The [employer] Administration cites drowsiness as a causing factor in more than 800,768 police reported crashes annually, resulting in 76,000 injuries and 1,500 deaths. Other surveys suggest 55% of people polled have driven while drowsy in the past year, 23% had fallen asleep but not crashed, 3% crashed, and 2% had and accident due to drowsy driving. Who is at risk? Young Drivers: One study of drowsy driving accidents states that 55% of the drivers were under 25 years. Of those, 75% were male. Shift Workers and Travelers: People who work overnight or travel across time zones frequently are at higher risk of experiencing Circadian Rhythm Disorders. They are trying to work and function when their body is programed to sleep. Sleep Deprived: Lack of sleep has a serious impact on your ability to pay attention or focus on a task. Consistently getting less than the average of 8 hours your body needs creates partial or cumulative sleep deprivation. Untreated Sleep Disorders: Sleep Apnea, Narcolepsy, R.L.S., and other sleep disorders (untreated) prevent a person from getting enough restful sleep. This leads to excessive daytime sleepiness and increases the risk for drowsy driving accidents by up to 7 times. Medications / Alcohol: Even over the counter medications can cause drowsiness. Medications that impair a drivers attention should have a warning label. Alcohol naturally makes you sleepy and on its own can cause accidents. Combined with excessive drowsiness its effects are amplified. Signs of Drowsy Driving: * You don't remember driving the last few miles * You may drift out of your selene * You are unable to focus and your thoughts wander * You may yawn more often than normal 
 * You have difficulty keeping your eyes open / nodding off * Missing traffic signs, speeding, or tailgating Prevention-  
Good sleep hygiene, lifestyle and behavioral choices have the most impact on drowsy driving.  There is no substitute for sleep and the average person requires 8 hours nightly. If you find yourself driving drowsy, stop and sleep. Consider the sleep hygiene tips provided during your visit as well. Medication Refill Policy: Refills for all medications require 1 week advance notice. Please have your pharmacy fax a refill request. We are unable to fax, or call in \"controled substance\" medications and you will need to pick these prescriptions up from our office. [employer] Activation Thank you for requesting access to [employer]. Please follow the instructions below to securely access and download your online medical record. [employer] allows you to send messages to your doctor, view your test results, renew your prescriptions, schedule appointments, and more. How Do I Sign Up? 1. In your internet browser, go to [email or web address]. 2. Click on the First Time User? Click Here link in the Sign In box. You will see the New Member Sign Up page. 3. Enter your [employer] Access Code exactly as it appears below. You will not need to use this code after youve completed the sign-up process. If you do not sign up before the expiration date, you must request a new code. [employer] Access Code: [email or web address] Expires: 2021 11:15 AM (This is the date your [employer] access code will ) 4. Enter the last four digits of your Social Security Number (xxxx) and Date of Birth (mm/dd/yyyy) as indicated and click Submit. You will be taken to the next sign-up page. 5. Create a Com[employer] ID. This will be your [employer] login ID and cannot be changed, so think of one that is secure and easy to remember. 6. Create a [employer] password. You can change your password at any time. 7. Enter your Password Reset Question and Answer. This can be used at a later time if you forget your password. 8. Enter your e-mail address. You will receive e-mail notification when new information is available in [address] 9. Click Sign Up.  You can now view and download portions of your medical record. 10. Click the Download Summary menu link to download a portable copy of your medical information. Additional Information If you have questions, please call [phone]. Remember, [employer] is NOT to be used for urgent needs. For medical emergencies, dial 911.

## 2021-04-20 NOTE — PROGRESS NOTES
No prior authorization required for HSAT with [facility] on 04/20/2021.

## 2021-04-20 NOTE — PROGRESS NOTES
Mary Anderson is a 79 y.o. male, evaluated via audio-only technology on 4/20/2021 for Sleep Problem (Yearly follow up) and Other ([phone], Audio only)  Patient called and identity confirmed with 2 patient identifers    I was in the office while conducting this encounter. S>Kevin Chavez is a 79 y.o. male seen at this telephone visit for a positive airway pressure follow-up. He reports major problems using the device. He is  compliant over the past 90 days. Wife laura machine into clinic but due to machine malfunctioning, download was not able to be retrieved. He is using it nightly. He cannot sleep without it. The following problems are identified:    Drowsiness No-tired all the time due to wakings from malfunctioning PAP Problems exhaling no   Snoring no Forget to put on Never, he cannot sleep without it   Mask Comfortable yes  Can't fall asleep No- but keeps waking due to machine cutting off   Dry Mouth no Mask falls off no   Air Leaking no Frequent awakenings Yes now that machine keeps turning off       Download not obtainable. (see tech note for further information)  He recently had a stroke and has right sided weakness. He is having PT/OT at home. He is unable to come to lab for split night study (this was ordered last year prior to the pandemic, so was not able to be completed)  He admits that his sleep has improved on PAP therapy when machine working. Allergies   Allergen Reactions    Augmentin [Amoxicillin-Pot Clavulanate] Hives    Keflex [Cephalexin] Hives    Pcn [Penicillins] Hives       He has a current medication list which includes the following prescription(s): phenobarbital, dilantin extended, phenobarbital, atorvastatin, albuterol sulfate, cpap machine, warfarin, fluticasone propion-salmeterol, allopurinol, guar gum, valsartan-hydrochlorothiazide, folic acid, ipratropium, pantoprazole, ferrous sulfate, and vitamin d3. Neo Silver       He  has a past medical history of Arthritis, Essential hypertension, GERD (gastroesophageal reflux disease), Gout, Hypertension, Morbid obesity ([address] Persistent dry cough, Seizures ([address] Sleep apnea, Unspecified cerebral artery occlusion with cerebral infarction, and Wheezing. [place] Sleepiness Score: 1   and     which reflect improved sleep quality over therapy time. A>    ICD-10-CM ICD-9-CM    1. Obstructive sleep apnea (adult) (pediatric)  G47.33 327.23    2. Controlled type 2 diabetes mellitus without complication, with long-term current use of insulin (HCC)  E11.9 250.00     Z79.4 V58.67       On CPAP :  12 cmH2O. Compliant:      yes    Therapeutic Response:  Positive    P>    he is compliant with PAP therapy and PAP continues to benefit patient and remains necessary for control of his sleep apnea. CPAP setting - he is still trying to use his current machine but it keeps turning off on its own at night. Patient's PAP device has exceeded its  Lifespan. Replacement device ordered. We do not have sleep study available and have not been able to retrieve it. He is unable to come into lab due to his recent stroke  Treatment options for sleep apnea were reviewed. He will proceed with an HSAT at home. Once results available he will be called and I will order replacement machine. He cannot sleep without CPAP. His wife brought machine yesterday but we were unable to download it    2. Type II diabetes - he continues on his current regimen. I have reviewed the relationship between sleep disordered breathing as it relates to diabetes. * We have recommended a dedicated weight loss through appropriate diet and an exercise regimen as significant weight reduction has been shown to reduce severity of obstructive sleep apnea. * He was asked to contact our office for any problems regarding PAP therapy. * Counseling was provided regarding the importance of regular PAP use and on proper sleep hygiene and safe driving.     * Re-enforced proper and regular cleaning for the device. All of his questions were addressed. Tamia Quintana Sr., who was evaluated through a patient-initiated, synchronous (real-time) audio only encounter, and/or her healthcare decision maker, is aware that it is a billable service, with coverage as determined by his insurance carrier. He provided verbal consent to proceed: Yes. He has not had a related appointment within my department in the past 7 days or scheduled within the next 24 hours.       Total Time: minutes: 11-20 minutes    Whitney Eng MD

## 2021-05-04 ENCOUNTER — HOSPITAL ENCOUNTER (OUTPATIENT)
Dept: SLEEP MEDICINE | Age: 71
Discharge: HOME | End: 2021-05-04
Payer: MEDICARE

## 2021-05-04 PROCEDURE — 95806 SLEEP STUDY UNATT&RESP EFFT: CPT | Performed by: INTERNAL MEDICINE

## 2021-05-05 ENCOUNTER — OFFICE VISIT (OUTPATIENT)
Dept: SLEEP MEDICINE | Age: 71
End: 2021-05-05

## 2021-05-05 ENCOUNTER — TELEPHONE (OUTPATIENT)
Dept: SLEEP MEDICINE | Age: 71
End: 2021-05-05

## 2021-05-05 DIAGNOSIS — G47.33 OSA (OBSTRUCTIVE SLEEP APNEA): Primary | ICD-10-CM

## 2021-05-05 NOTE — TELEPHONE ENCOUNTER
Results of sleep study in [facility]  Lead tech to convey results to patient    HSAT showed severe sleep apnea. AHI 57/hour with oxygen desatuations to 77%. Staff will sent HSAT results with his replacement PAP order (in chart from last visit)- his current machine is malfunctioning. Staff to facilitate timely set up     Order PAP and call patient and let them know which DME company they should be hearing from. Schedule for first adherence visit in 6 weeks.

## 2021-05-05 NOTE — TELEPHONE ENCOUNTER
Reviewed sleep study results with patient. He expressed understanding and is willing to proceed with a replacement pap order .

## 2021-05-07 ENCOUNTER — TELEPHONE (OUTPATIENT)
Dept: SLEEP MEDICINE | Age: 71
End: 2021-05-07

## 2021-05-07 DIAGNOSIS — G47.33 OBSTRUCTIVE SLEEP APNEA (ADULT) (PEDIATRIC): Primary | ICD-10-CM

## 2021-05-07 NOTE — TELEPHONE ENCOUNTER
----- Message from Jerri Ahumada sent at 5/5/2021 12:31 PM EDT -----  Regarding: Order  I just scored an HSAT on this patient and noticed there is no order.   Also, the AHI is 57

## 2021-06-22 DIAGNOSIS — I63.39 CEREBRAL INFARCTION DUE TO THROMBOSIS OF OTHER CEREBRAL ARTERY (HCC): ICD-10-CM

## 2021-06-22 DIAGNOSIS — I65.23 STENOSIS OF BOTH INTERNAL CAROTID ARTERIES: ICD-10-CM

## 2021-06-22 DIAGNOSIS — G40.209 COMPLEX PARTIAL SEIZURE EVOLVING TO GENERALIZED SEIZURE (HCC): ICD-10-CM

## 2021-06-22 DIAGNOSIS — Z86.718 HISTORY OF CEREBRAL THROMBOSIS: ICD-10-CM

## 2021-06-22 DIAGNOSIS — I69.959 HEMIPLEGIA OF DOMINANT SIDE AS LATE EFFECT FOLLOWING CEREBROVASCULAR DISEASE (HCC): ICD-10-CM

## 2021-06-22 DIAGNOSIS — G40.209 LOCALIZATION-RELATED PARTIAL EPILEPSY WITH COMPLEX PARTIAL SEIZURES (HCC): ICD-10-CM

## 2021-06-22 DIAGNOSIS — G40.209 PARTIAL EPILEPSY WITH IMPAIRMENT OF CONSCIOUSNESS (HCC): ICD-10-CM

## 2021-06-22 RX ORDER — PHENOBARBITAL 30 MG/1
TABLET ORAL
Qty: 360 TABLET | Refills: 0 | Status: SHIPPED | OUTPATIENT
Start: 2021-06-22 | End: 2021-06-24 | Stop reason: ALTCHOICE

## 2021-06-24 ENCOUNTER — OFFICE VISIT (OUTPATIENT)
Dept: NEUROLOGY | Age: 71
End: 2021-06-24
Payer: MEDICARE

## 2021-06-24 VITALS
BODY MASS INDEX: 52.81 KG/M2 | HEART RATE: 107 BPM | SYSTOLIC BLOOD PRESSURE: 150 MMHG | DIASTOLIC BLOOD PRESSURE: 60 MMHG | HEIGHT: 73 IN

## 2021-06-24 DIAGNOSIS — G40.209 PARTIAL EPILEPSY WITH IMPAIRMENT OF CONSCIOUSNESS (HCC): ICD-10-CM

## 2021-06-24 DIAGNOSIS — I65.23 STENOSIS OF BOTH INTERNAL CAROTID ARTERIES: Primary | ICD-10-CM

## 2021-06-24 DIAGNOSIS — I69.959 HEMIPLEGIA OF DOMINANT SIDE AS LATE EFFECT FOLLOWING CEREBROVASCULAR DISEASE (HCC): ICD-10-CM

## 2021-06-24 DIAGNOSIS — G40.209 LOCALIZATION-RELATED PARTIAL EPILEPSY WITH COMPLEX PARTIAL SEIZURES (HCC): ICD-10-CM

## 2021-06-24 DIAGNOSIS — I63.39 CEREBRAL INFARCTION DUE TO THROMBOSIS OF OTHER CEREBRAL ARTERY (HCC): ICD-10-CM

## 2021-06-24 DIAGNOSIS — Z86.718 HISTORY OF CEREBRAL THROMBOSIS: ICD-10-CM

## 2021-06-24 DIAGNOSIS — G40.209 COMPLEX PARTIAL SEIZURE EVOLVING TO GENERALIZED SEIZURE (HCC): ICD-10-CM

## 2021-06-24 PROCEDURE — G8754 DIAS BP LESS 90: HCPCS | Performed by: PSYCHIATRY & NEUROLOGY

## 2021-06-24 PROCEDURE — G8427 DOCREV CUR MEDS BY ELIG CLIN: HCPCS | Performed by: PSYCHIATRY & NEUROLOGY

## 2021-06-24 PROCEDURE — G8432 DEP SCR NOT DOC, RNG: HCPCS | Performed by: PSYCHIATRY & NEUROLOGY

## 2021-06-24 PROCEDURE — 99214 OFFICE O/P EST MOD 30 MIN: CPT | Performed by: PSYCHIATRY & NEUROLOGY

## 2021-06-24 PROCEDURE — G8417 CALC BMI ABV UP PARAM F/U: HCPCS | Performed by: PSYCHIATRY & NEUROLOGY

## 2021-06-24 PROCEDURE — 3017F COLORECTAL CA SCREEN DOC REV: CPT | Performed by: PSYCHIATRY & NEUROLOGY

## 2021-06-24 PROCEDURE — G8536 NO DOC ELDER MAL SCRN: HCPCS | Performed by: PSYCHIATRY & NEUROLOGY

## 2021-06-24 PROCEDURE — 1101F PT FALLS ASSESS-DOCD LE1/YR: CPT | Performed by: PSYCHIATRY & NEUROLOGY

## 2021-06-24 PROCEDURE — G8753 SYS BP > OR = 140: HCPCS | Performed by: PSYCHIATRY & NEUROLOGY

## 2021-06-24 RX ORDER — ALBUTEROL SULFATE 0.83 MG/ML
SOLUTION RESPIRATORY (INHALATION)
COMMUNITY

## 2021-06-24 RX ORDER — AMLODIPINE BESYLATE 10 MG/1
10 TABLET ORAL DAILY
COMMUNITY
Start: 2021-04-27

## 2021-06-24 RX ORDER — FLUTICASONE PROPIONATE AND SALMETEROL 250; 50 UG/1; UG/1
1 POWDER RESPIRATORY (INHALATION) EVERY 12 HOURS
COMMUNITY

## 2021-06-24 RX ORDER — INSULIN GLARGINE 100 [IU]/ML
5 INJECTION, SOLUTION SUBCUTANEOUS DAILY
COMMUNITY

## 2021-06-24 RX ORDER — IRBESARTAN AND HYDROCHLOROTHIAZIDE 300; 12.5 MG/1; MG/1
1 TABLET, FILM COATED ORAL DAILY
COMMUNITY
Start: 2021-06-15

## 2021-06-24 RX ORDER — PHENOBARBITAL 32.4 MG/1
32.4 TABLET ORAL 4 TIMES DAILY
Qty: 120 TABLET | Refills: 5 | Status: SHIPPED | OUTPATIENT
Start: 2021-06-24

## 2021-06-24 NOTE — LETTER
6/24/2021    Patient: Morgan Espinal. YOB: 1950   Date of Visit: 6/24/2021     Elizabeth Morris, [address]  [address] [id number]  Via Fax: [phone]    Dear Elizabeth Morris MD,      Thank you for referring Mr. Carmen Sorenson to [address] [facility] for evaluation. My notes for this consultation are attached. Consult    Subjective:     Morgan Espinal is a 79 y. o.right handed Afro-American male seen today for evaluation at the request of Dr. Nickolas Dunne of new problem of running out of his medication of phenobarbital, and needing it refilled urgently to prevent his seizures. He has not had any seizures but he needs his medications today. He takes phenobarbital 32 mg 4 times a day, taking 1 twice a day and 2 at night, and he takes a Dilantin with each dose of phenobarbital 3 times a day 100 mg. He has been off of that for now. He has not had any seizures or strokes since his last visit 2 years ago, and he is due for carotid Doppler studies today because his been 2 years since his last Doppler which showed no surgical disease. He is on Coumadin for anticoagulation. He has now been found to be diabetic and has mild diabetic neuropathy we advised the patient that the single best treatment of his neuropathy is good control of the blood sugars. He is encouraged to try to lose weight and exercise some. He does have increased blood pressure today, and he seen his PCP in 4 days, and we advised him to watch his blood pressure carefully at home and report the findings to his PCP to consider possibly increasing his medication. He just had Norvasc added 10 mg a day to his current medications and is still running a little high today.   He is also seen for his stroke and seizures that occurred 20 years ago leaving him with mild right hemiparesis and seizures, and the patient has done well without recurring seizures or new medical problems other than asthma and shortness of breath that sounds a little bit more cardiogenic because he can only walk a short distance before he gets short of breath. He continues to take phenobarbital 30 mg 4 times a day and Dilantin 100 mg 3 times a day is a not had a seizure in 22 years. His last drug levels done 1 year ago were therapeutic and they will be continued. New prescription sent in for patient today. He had no recurrent strokelike symptoms. He's had no recurrent strokes but occasionally gets some lightheadedness and dizziness and a sensation of weakness. He has a mild right hemiparesis from his previous stroke 3 years ago. He continues on Coumadin for anticoagulation. He has a history of a clipped muscle in his left eye in the past for strabismus. He's had no new focal weakness, sensory loss, visual changes, cognitive changes or any other new focal neurological symptoms. He had no recurrent strokelike symptoms, on chronic Coumadin therapy for his atrial fibrillation. He had no recurrent seizures, new focal weakness, sensory loss, visual changes, cognitive issues or other strokelike symptoms. He has significant degenerative arthritis and has to walk with a cane and move slowly cause of his arthritis. Patient was last seen 1 years ago and has remained stable     Patient's complete review of systems in symptoms was negative for any new medical problems palpitations or illnesses.     Past Medical History:   Diagnosis Date    Arthritis     Essential hypertension     GERD (gastroesophageal reflux disease)     Gout     Hypertension     Morbid obesity ([address]     Persistent dry cough     Seizures (HCC)     NONE IN 25 YR    Sleep apnea     USES CPAP    Unspecified cerebral artery occlusion with cerebral infarction     taking coumadin    Wheezing       Past Surgical History:   Procedure Laterality Date    HX GI      COLONOSCOPY/EGD    HX HEENT      \"clipped muscle\" in left eye     Family History   Problem Relation Age of Onset    Hypertension Mother     Other Mother         cerebral aneurysm    Stroke Father     Hypertension Sister     Hypertension Child     Asthma Neg Hx     Cancer Neg Hx     Diabetes Neg Hx     Heart Disease Neg Hx     Malignant Hyperthermia Neg Hx     Pseudocholinesterase Deficiency Neg Hx     Delayed Awakening Neg Hx     Post-op Nausea/Vomiting Neg Hx       Social History     Tobacco Use    Smoking status: Former Smoker     Years: 30.00     Quit date: 11/3/2001     Years since quittin.6    Smokeless tobacco: Never Used    Tobacco comment: SMOKED CIGARS   Substance Use Topics    Alcohol use: No       Current Outpatient Medications   Medication Sig Dispense Refill    amLODIPine (NORVASC) 10 mg tablet Take 10 mg by mouth daily.  irbesartan-hydroCHLOROthiazide (AVALIDE) 300-12.5 mg per tablet Take 1 Tablet by mouth daily.  insulin glargine (LANTUS,BASAGLAR) 100 unit/mL (3 mL) inpn 5 Units by SubCUTAneous route daily.  albuterol (PROVENTIL VENTOLIN) 2.5 mg /3 mL (0.083 %) nebu by Nebulization route.  fluticasone propion-salmeteroL (Advair Diskus) 250-50 mcg/dose diskus inhaler Take 1 Puff by inhalation every twelve (12) hours.  PHENobarbitaL (LUMINAL) 32.4 mg tablet Take 1 Tablet by mouth four (4) times daily. Max Daily Amount: 129.6 mg. 120 Tablet 5    Dilantin Extended 100 mg ER capsule TAKE ONE CAPSULE BY MOUTH 3 TIMES A  Cap 11    atorvastatin (LIPITOR) 20 mg tablet Take  by mouth daily.  ALBUTEROL SULFATE (VENTOLIN HFA IN) Take  by inhalation.  cpap machine kit by Does Not Apply route.  warfarin (COUMADIN) 2.5 mg tablet Take 2.5 mg by mouth as directed. Take 2.5 mg (1 tablet) every Monday. Take 5 mg (2 tablets) Tuesday through .  fluticasone-salmeterol (ADVAIR DISKUS) 500-50 mcg/dose diskus inhaler Take 1 Puff by inhalation every twelve (12) hours.  allopurinol (ZYLOPRIM) 300 mg tablet Take  by mouth daily.  BENEFIBER, GUAR GUM, PO Take  by mouth.  folic acid (FOLVITE) 1 mg tablet Take  by mouth daily.  ipratropium (ATROVENT) 0.06 % nasal spray 2 Sprays four (4) times daily.  pantoprazole (PROTONIX) 40 mg tablet Take 40 mg by mouth daily.  FERROUS SULFATE (SLOW FE PO) Take  by mouth.  cholecalciferol (VITAMIN D3) (1000 Units /25 mcg) tablet Take  by mouth daily. Allergies   Allergen Reactions    Augmentin [Amoxicillin-Pot Clavulanate] Hives    Keflex [Cephalexin] Hives    Pcn [Penicillins] Hives        Review of Systems:  A comprehensive review of systems was negative except for: Cardiovascular: positive for chest pressure/discomfort, dyspnea, palpitations, irregular heart beats, exertional chest pressure/discomfort, atrial fibrillation  Musculoskeletal: positive for myalgias, arthralgias, stiff joints, back pain and muscle weakness  Neurological: positive for paresthesia, coordination problems, gait problems and weakness  Behvioral/Psych: positive for anxiety and depression     Objective:     I      NEUROLOGICAL EXAM:    Appearance: The patient is well developed, well nourished,  Obese, provides a coherent history and is in no acute distress. Mental Status: Oriented to place and person, and the president, but not the date, and does seem a little slow mentally and has some memory problems, but speech is fluent without aphasia or dysarthria. Mood and affect slow. Cranial Nerves:   Intact visual fields. Fundi are benign, discs are flat but fundi are poorly seen. Windy Evre TALIA, EOM's full, no nystagmus, no ptosis. Facial sensation is normal. Corneal reflexes are not tested. Facial movement is symmetric. Hearing is normal bilaterally. Palate is midline with normal sternocleidomastoid and trapezius muscles are normal. Tongue is midline.   Neck without meningismus or bruits  Temporal arteries are not tender or enlarged   Motor:  4/5 strength in upper and lower proximal and distal muscles, the patient probably had a minimal right hemiparesis. Normal bulk and tone. No fasciculations. Rapid altering movement is slow bilaterally, slightly worse on the right side   Reflexes:   Deep tendon reflexes 1+/4 and symmetrical. No clear Babinski or clonus present   Sensory:   Normal to touch, pinprick and vibration mildly decreased in both lower extremities. DSS is intact   Gait:  Abnormal gait, as patient has to walk with a cane and move slowly due to severe arthritis and back and knees. Tremor:   No tremor noted. Cerebellar:  No cerebellar signs present on finger-nose-finger examination, but Romberg and tandem are moderately severely abnormal.   Neurovascular:  Abnormal heart sounds and irregular rhythm, peripheral pulses decreased in both feet, and no carotid bruits. Assessment:     Plan:     New problem of running out of medication, and urgently needing his refills, and they were sent in for patient today, he remains stable on his current dose of medication and will continue that, it is controlling his seizures well and he has no side effects on the medications. Patient has elevated blood pressure but will see his PCP there is only a mild elevation and he did rush to get here and just had his medication increased by adding Norvasc 10 mg a day to his current treatment regime, will probably go through early to increase the blood pressure medicines yet since he also has periods of lightheadedness and dizziness that sounds almost like hypotension. Patient has new problem of recently being diabetic and has a mild diabetic neuropathy symptoms, he is advised again that the single best treatment for his diabetic neuropathy is good control of his blood sugars. Patient has previous history of stroke is on Coumadin, will check a carotid Doppler studies today which 2 years ago just showed nonsurgical disease.   Patient to get repeat carotid Doppler studies done since been 1 year since his last study and continue his anticoagulation as he is doing well on that. Patient with previous stroke, on Coumadin atrial fibrillation, without recurrent neurologic symptoms, will repeat his carotid Doppler study since been more than 2 years we could not image his vertebral arteries last time to make sure there is no vertebrobasilar insufficiency  Patient without seizures for 22 years, we will recheck his levels of his medications, and all of his medications refilled for the patient today for his seizures. He was counseled about his stroke, I think he needs to control his blood pressure, his blood sugars, not to smoke which he does not, and to control his cholesterol his PCP is managing that and he seems to be doing well from that standpoint. We will recheck his levels of his medications to make sure they have not dropped to low in a near the therapeutic range. We discussed the condition with the patient and his wife in detail, and they are happy with his current treatment and medical status, and want to just continue the current dose of medications which we will do. Continue current medications. Time spent with patient was 36 minutes discussing his case with the patient and his wife in detail and ordering his medications, going over his records, going over his new diagnosis of diabetes and advising the patient on how to control that, weight loss exercise to control her blood sugars and watching his diet, that was a single best treatment for his diabetic neuropathy and staying healthy and prevent stroke and vascular disease  I reviewed his carotid Doppler studies personally on the PACS system myself done 2 years ago and decided he indeed needs another Doppler  His levels of his anticonvulsants were relatively normal one year ago  He is to try to exercise regularly, lose weight, and take his vitamins daily  Followup in one years time or earlier if needed. Patient condition discuss with patient and his wife in detail.  They agree with current plans in therapy    Signed By: Tino Brock MD     June 24, 2021                 If you have questions, please do not hesitate to call me. I look forward to following your patient along with you.       Sincerely,    Tino Brock MD

## 2021-06-24 NOTE — PROGRESS NOTES
Consult    Subjective:     Jazmyn Ragsdale is a 79 y. o.right handed Afro-American male seen today for evaluation at the request of Dr. Mckenzie Juarez of new problem of running out of his medication of phenobarbital, and needing it refilled urgently to prevent his seizures. He has not had any seizures but he needs his medications today. He takes phenobarbital 32 mg 4 times a day, taking 1 twice a day and 2 at night, and he takes a Dilantin with each dose of phenobarbital 3 times a day 100 mg. He has been off of that for now. He has not had any seizures or strokes since his last visit 2 years ago, and he is due for carotid Doppler studies today because his been 2 years since his last Doppler which showed no surgical disease. He is on Coumadin for anticoagulation. He has now been found to be diabetic and has mild diabetic neuropathy we advised the patient that the single best treatment of his neuropathy is good control of the blood sugars. He is encouraged to try to lose weight and exercise some. He does have increased blood pressure today, and he seen his PCP in 4 days, and we advised him to watch his blood pressure carefully at home and report the findings to his PCP to consider possibly increasing his medication. He just had Norvasc added 10 mg a day to his current medications and is still running a little high today. He is also seen for his stroke and seizures that occurred 20 years ago leaving him with mild right hemiparesis and seizures, and the patient has done well without recurring seizures or new medical problems other than asthma and shortness of breath that sounds a little bit more cardiogenic because he can only walk a short distance before he gets short of breath. He continues to take phenobarbital 30 mg 4 times a day and Dilantin 100 mg 3 times a day is a not had a seizure in 22 years. His last drug levels done 1 year ago were therapeutic and they will be continued.   New prescription sent in for patient today. He had no recurrent strokelike symptoms. He's had no recurrent strokes but occasionally gets some lightheadedness and dizziness and a sensation of weakness. He has a mild right hemiparesis from his previous stroke 3 years ago. He continues on Coumadin for anticoagulation. He has a history of a clipped muscle in his left eye in the past for strabismus. He's had no new focal weakness, sensory loss, visual changes, cognitive changes or any other new focal neurological symptoms. He had no recurrent strokelike symptoms, on chronic Coumadin therapy for his atrial fibrillation. He had no recurrent seizures, new focal weakness, sensory loss, visual changes, cognitive issues or other strokelike symptoms. He has significant degenerative arthritis and has to walk with a cane and move slowly cause of his arthritis. Patient was last seen 1 years ago and has remained stable     Patient's complete review of systems in symptoms was negative for any new medical problems palpitations or illnesses.     Past Medical History:   Diagnosis Date    Arthritis     Essential hypertension     GERD (gastroesophageal reflux disease)     Gout     Hypertension     Morbid obesity ([address]     Persistent dry cough     Seizures (HCC)     NONE IN 25 YR    Sleep apnea     USES CPAP    Unspecified cerebral artery occlusion with cerebral infarction     taking coumadin    Wheezing       Past Surgical History:   Procedure Laterality Date    HX GI      COLONOSCOPY/EGD    HX HEENT      \"clipped muscle\" in left eye     Family History   Problem Relation Age of Onset    Hypertension Mother     Other Mother         cerebral aneurysm    Stroke Father     Hypertension Sister     Hypertension Child     Asthma Neg Hx     Cancer Neg Hx     Diabetes Neg Hx     Heart Disease Neg Hx     Malignant Hyperthermia Neg Hx     Pseudocholinesterase Deficiency Neg Hx     Delayed Awakening Neg Hx     Post-op Nausea/Vomiting Neg Hx Social History     Tobacco Use    Smoking status: Former Smoker     Years: 30.00     Quit date: 11/3/2001     Years since quittin.6    Smokeless tobacco: Never Used    Tobacco comment: SMOKED CIGARS   Substance Use Topics    Alcohol use: No       Current Outpatient Medications   Medication Sig Dispense Refill    amLODIPine (NORVASC) 10 mg tablet Take 10 mg by mouth daily.  irbesartan-hydroCHLOROthiazide (AVALIDE) 300-12.5 mg per tablet Take 1 Tablet by mouth daily.  insulin glargine (LANTUS,BASAGLAR) 100 unit/mL (3 mL) inpn 5 Units by SubCUTAneous route daily.  albuterol (PROVENTIL VENTOLIN) 2.5 mg /3 mL (0.083 %) nebu by Nebulization route.  fluticasone propion-salmeteroL (Advair Diskus) 250-50 mcg/dose diskus inhaler Take 1 Puff by inhalation every twelve (12) hours.  PHENobarbitaL (LUMINAL) 32.4 mg tablet Take 1 Tablet by mouth four (4) times daily. Max Daily Amount: 129.6 mg. 120 Tablet 5    Dilantin Extended 100 mg ER capsule TAKE ONE CAPSULE BY MOUTH 3 TIMES A  Cap 11    atorvastatin (LIPITOR) 20 mg tablet Take  by mouth daily.  ALBUTEROL SULFATE (VENTOLIN HFA IN) Take  by inhalation.  cpap machine kit by Does Not Apply route.  warfarin (COUMADIN) 2.5 mg tablet Take 2.5 mg by mouth as directed. Take 2.5 mg (1 tablet) every Monday. Take 5 mg (2 tablets) Tuesday through .  fluticasone-salmeterol (ADVAIR DISKUS) 500-50 mcg/dose diskus inhaler Take 1 Puff by inhalation every twelve (12) hours.  allopurinol (ZYLOPRIM) 300 mg tablet Take  by mouth daily.  BENEFIBER, GUAR GUM, PO Take  by mouth.  folic acid (FOLVITE) 1 mg tablet Take  by mouth daily.  ipratropium (ATROVENT) 0.06 % nasal spray 2 Sprays four (4) times daily.  pantoprazole (PROTONIX) 40 mg tablet Take 40 mg by mouth daily.  FERROUS SULFATE (SLOW FE PO) Take  by mouth.  cholecalciferol (VITAMIN D3) (1000 Units /25 mcg) tablet Take  by mouth daily. Allergies   Allergen Reactions    Augmentin [Amoxicillin-Pot Clavulanate] Hives    Keflex [Cephalexin] Hives    Pcn [Penicillins] Hives        Review of Systems:  A comprehensive review of systems was negative except for: Cardiovascular: positive for chest pressure/discomfort, dyspnea, palpitations, irregular heart beats, exertional chest pressure/discomfort, atrial fibrillation  Musculoskeletal: positive for myalgias, arthralgias, stiff joints, back pain and muscle weakness  Neurological: positive for paresthesia, coordination problems, gait problems and weakness  Behvioral/Psych: positive for anxiety and depression     Objective:     I      NEUROLOGICAL EXAM:    Appearance: The patient is well developed, well nourished,  Obese, provides a coherent history and is in no acute distress. Mental Status: Oriented to place and person, and the president, but not the date, and does seem a little slow mentally and has some memory problems, but speech is fluent without aphasia or dysarthria. Mood and affect slow. Cranial Nerves:   Intact visual fields. Fundi are benign, discs are flat but fundi are poorly seen. Jackie MELGAR, EOM's full, no nystagmus, no ptosis. Facial sensation is normal. Corneal reflexes are not tested. Facial movement is symmetric. Hearing is normal bilaterally. Palate is midline with normal sternocleidomastoid and trapezius muscles are normal. Tongue is midline. Neck without meningismus or bruits  Temporal arteries are not tender or enlarged   Motor:  4/5 strength in upper and lower proximal and distal muscles, the patient probably had a minimal right hemiparesis. Normal bulk and tone. No fasciculations. Rapid altering movement is slow bilaterally, slightly worse on the right side   Reflexes:   Deep tendon reflexes 1+/4 and symmetrical. No clear Babinski or clonus present   Sensory:   Normal to touch, pinprick and vibration mildly decreased in both lower extremities.   DSS is intact   Gait: Abnormal gait, as patient has to walk with a cane and move slowly due to severe arthritis and back and knees. Tremor:   No tremor noted. Cerebellar:  No cerebellar signs present on finger-nose-finger examination, but Romberg and tandem are moderately severely abnormal.   Neurovascular:  Abnormal heart sounds and irregular rhythm, peripheral pulses decreased in both feet, and no carotid bruits. Assessment:     Plan:     New problem of running out of medication, and urgently needing his refills, and they were sent in for patient today, he remains stable on his current dose of medication and will continue that, it is controlling his seizures well and he has no side effects on the medications. Patient has elevated blood pressure but will see his PCP there is only a mild elevation and he did rush to get here and just had his medication increased by adding Norvasc 10 mg a day to his current treatment regime, will probably go through early to increase the blood pressure medicines yet since he also has periods of lightheadedness and dizziness that sounds almost like hypotension. Patient has new problem of recently being diabetic and has a mild diabetic neuropathy symptoms, he is advised again that the single best treatment for his diabetic neuropathy is good control of his blood sugars. Patient has previous history of stroke is on Coumadin, will check a carotid Doppler studies today which 2 years ago just showed nonsurgical disease. Patient to get repeat carotid Doppler studies done since been 1 year since his last study and continue his anticoagulation as he is doing well on that.   Patient with previous stroke, on Coumadin atrial fibrillation, without recurrent neurologic symptoms, will repeat his carotid Doppler study since been more than 2 years we could not image his vertebral arteries last time to make sure there is no vertebrobasilar insufficiency  Patient without seizures for 22 years, we will recheck his levels of his medications, and all of his medications refilled for the patient today for his seizures. He was counseled about his stroke, I think he needs to control his blood pressure, his blood sugars, not to smoke which he does not, and to control his cholesterol his PCP is managing that and he seems to be doing well from that standpoint. We will recheck his levels of his medications to make sure they have not dropped to low in a near the therapeutic range. We discussed the condition with the patient and his wife in detail, and they are happy with his current treatment and medical status, and want to just continue the current dose of medications which we will do. Continue current medications. Time spent with patient was 36 minutes discussing his case with the patient and his wife in detail and ordering his medications, going over his records, going over his new diagnosis of diabetes and advising the patient on how to control that, weight loss exercise to control her blood sugars and watching his diet, that was a single best treatment for his diabetic neuropathy and staying healthy and prevent stroke and vascular disease  I reviewed his carotid Doppler studies personally on the PACS system myself done 2 years ago and decided he indeed needs another Doppler  His levels of his anticonvulsants were relatively normal one year ago  He is to try to exercise regularly, lose weight, and take his vitamins daily  Followup in one years time or earlier if needed. Patient condition discuss with patient and his wife in detail.  They agree with current plans in therapy    Signed By: Bryant Mojica MD     June 24, 2021

## 2021-09-21 DIAGNOSIS — G40.209 COMPLEX PARTIAL SEIZURE EVOLVING TO GENERALIZED SEIZURE (HCC): Primary | ICD-10-CM

## 2021-09-21 RX ORDER — PHENOBARBITAL 30 MG/1
TABLET ORAL
Qty: 360 TABLET | Refills: 0 | Status: SHIPPED | OUTPATIENT
Start: 2021-09-21 | End: 2021-12-07

## 2021-12-07 DIAGNOSIS — G40.209 COMPLEX PARTIAL SEIZURE EVOLVING TO GENERALIZED SEIZURE (HCC): ICD-10-CM

## 2021-12-07 RX ORDER — PHENOBARBITAL 30 MG/1
TABLET ORAL
Qty: 360 TABLET | Refills: 0 | Status: SHIPPED | OUTPATIENT
Start: 2021-12-07

## 2022-03-19 PROBLEM — E66.01 OBESITY, MORBID (HCC): Status: ACTIVE | Noted: 2018-07-11

## 2023-03-23 NOTE — TELEPHONE ENCOUNTER
Called in script for patient
Future Appointments  Date Time Provider Benedicto Duenas   7/11/2018 8:00 AM Lizzie Dias MD [address]                         Last Appointment My Department:  6/28/16    Please advise of refill below. Requested Prescriptions     Pending Prescriptions Disp Refills    PHENobarbital (LUMINAL) 32.4 mg tablet 360 Tab 1     Sig: Take 1 Tab by mouth four (4) times daily.  Max Daily Amount: 129.6 mg.
normal

## 2023-07-04 NOTE — LETTER
9/18/2019 1:01 PM 
 
Patient:  Erna Minaya Sr. YOB: 1950 Date of Visit: 9/18/2019 Dear No Recipients: Thank you for referring . Mara De La O to me for evaluation/treatment. Below are the relevant portions of my assessment and plan of care. Consult Subjective:  
 
Marco A Vigil is a 71 y. o.right handed Afro-American male seen today for evaluation at the request of Dr. Gladys Deleon of new problem of increasing shortness of breath when he exerts himself, and I advised him he needs to talk to his PCP for this, most likely is some need to his cardiopulmonary disorder and possibly due to his weight. He has not had a real cardiac evaluation recently. Is on chronic Coumadin therapy because of his history of strokes and for his atrial fibrillation. The patient probably needs a cardiac evaluation as he said he is not had one in years. He is also seen for his stroke and seizures that occurred 20 years ago leaving him with mild right hemiparesis and seizures, and the patient has done well without recurring seizures or new medical problems other than asthma and shortness of breath that sounds a little bit more cardiogenic because he can only walk a short distance before he gets short of breath. He continues to take phenobarbital 30 mg 4 times a day and Dilantin 100 mg 3 times a day is a not had a seizure in 20 years. His last drug levels done 1 year ago were therapeutic and they will be continued. New prescription sent in for patient today. He had no recurrent strokelike symptoms. Patient had a carotid Doppler study done 1-1/2 years ago and did have inability to image the vertebral arteries last time, so we will repeat his Doppler again to make sure there is no progressive stenosis or cerebrovascular insufficiency. .  He's had no recurrent strokes but occasionally gets some lightheadedness and dizziness and a sensation of weakness. He has a mild right hemiparesis from his previous stroke 3 years ago. He continues on Coumadin for anticoagulation. He has a history of a clipped muscle in his left eye in the past for strabismus. His carotid Dopplers will be repeated on Thursday. Alireza Zuniga He's had no new focal weakness, sensory loss, visual changes, cognitive changes or any other new focal neurological symptoms. He had no recurrent strokelike symptoms, on chronic Coumadin therapy for his atrial fibrillation. He had no recurrent seizures, new focal weakness, sensory loss, visual changes, cognitive issues or other strokelike symptoms. His carotid Doppler showed less than 50% disease in the past. We will repeat those Dopplers in 1 weeks. He has significant degenerative arthritis and has to walk with a cane and move slowly cause of his arthritis. Patient was last seen 1 years ago and has remained stable Patient's complete review of systems in symptoms was negative for any new medical problems palpitations or illnesses. Past Medical History:  
Diagnosis Date  Arthritis  Essential hypertension  GERD (gastroesophageal reflux disease)  Gout  Hypertension  Morbid obesity ([address]  Persistent dry cough  Seizures ([address] NONE IN 25 YR  Sleep apnea USES CPAP  
 Unspecified cerebral artery occlusion with cerebral infarction   
 taking coumadin  Wheezing Past Surgical History:  
Procedure Laterality Date  HX GI    
 COLONOSCOPY/EGD  HX HEENT \"clipped muscle\" in left eye Family History Problem Relation Age of Onset  Hypertension Mother  Other Mother   
     cerebral aneurysm  Stroke Father  Hypertension Sister  Hypertension Child  Asthma Neg Hx  Cancer Neg Hx  Diabetes Neg Hx   
 Heart Disease Neg Hx  Malignant Hyperthermia Neg Hx  Pseudocholinesterase Deficiency Neg Hx  Delayed Awakening Neg Hx  Post-op Nausea/Vomiting Neg Hx Social History Tobacco Use  Smoking status: Former Smoker Years: 30.00 Last attempt to quit: 11/3/2001 Years since quittin.8  Smokeless tobacco: Never Used  Tobacco comment: SMOKED CIGARS Substance Use Topics  Alcohol use: No  
   
Current Outpatient Medications Medication Sig Dispense Refill  PHENobarbital (LUMINAL) 30 mg tablet TAKE ONE TABLET BY MOUTH FOUR TIMES A  Tab 5  DILANTIN EXTENDED 100 mg ER capsule Take 1 Cap by mouth three (3) times daily. GISELA 270 Cap 4  
 PHENobarbital (LUMINAL) 32.4 mg tablet TAKE ONE TABLET BY MOUTH FOUR TIMES A  Tab 0  
 atorvastatin (LIPITOR) 20 mg tablet Take  by mouth daily.  ALBUTEROL SULFATE (VENTOLIN HFA IN) Take  by inhalation.  cpap machine kit by Does Not Apply route.  warfarin (COUMADIN) 2.5 mg tablet Take 2.5 mg by mouth as directed. Take 2.5 mg (1 tablet) every Monday. Take 5 mg (2 tablets) Tuesday through .  fluticasone-salmeterol (ADVAIR DISKUS) 500-50 mcg/dose diskus inhaler Take 1 Puff by inhalation every twelve (12) hours.  allopurinol (ZYLOPRIM) 300 mg tablet Take  by mouth daily.  BENEFIBER, GUAR GUM, PO Take  by mouth.  valsartan-hydrochlorothiazide (DIOVAN HCT) 160-25 mg per tablet Take 1 Tab by mouth daily.  folic acid (FOLVITE) 1 mg tablet Take  by mouth daily.  ipratropium (ATROVENT) 0.06 % nasal spray 2 Sprays four (4) times daily.  pantoprazole (PROTONIX) 40 mg tablet Take 40 mg by mouth daily.  FERROUS SULFATE (SLOW FE PO) Take  by mouth.  cholecalciferol (VITAMIN D3) 1,000 unit tablet Take  by mouth daily. Allergies Allergen Reactions  Augmentin [Amoxicillin-Pot Clavulanate] Hives  Keflex [Cephalexin] Hives  Pcn [Penicillins] Hives Review of Systems: A comprehensive review of systems was negative except for: Cardiovascular: positive for chest pressure/discomfort, dyspnea, palpitations, irregular heart beats, exertional chest pressure/discomfort, atrial fibrillation Musculoskeletal: positive for myalgias, arthralgias, stiff joints, back pain and muscle weakness Neurological: positive for paresthesia, coordination problems, gait problems and weakness Behvioral/Psych: positive for anxiety and depression Objective: I 
 
 
NEUROLOGICAL EXAM: 
 
Appearance: The patient is well developed, well nourished,  Obese, provides a coherent history and is in no acute distress. Mental Status: Oriented to place and person, and the president, but not the date, and does seem a little slow mentally and has some memory problems, but speech is fluent without aphasia or dysarthria. Mood and affect slow. Cranial Nerves:   Intact visual fields. Fundi are benign, discs are flat but fundi are poorly seen. Hershell Broach TALIA, EOM's full, no nystagmus, no ptosis. Facial sensation is normal. Corneal reflexes are not tested. Facial movement is symmetric. Hearing is normal bilaterally. Palate is midline with normal sternocleidomastoid and trapezius muscles are normal. Tongue is midline. Neck without meningismus or bruits Temporal arteries are not tender or enlarged Motor:  4/5 strength in upper and lower proximal and distal muscles, the patient probably had a minimal right hemiparesis. Normal bulk and tone. No fasciculations. Rapid altering movement is slow bilaterally, slightly worse on the right side Reflexes:   Deep tendon reflexes 1+/4 and symmetrical. No clear Babinski or clonus present Sensory:   Normal to touch, pinprick and vibration mildly decreased in both lower extremities. DSS is intact Gait:  Abnormal gait, as patient has to walk with a cane and move slowly due to severe arthritis and back and knees. Tremor:   No tremor noted.   
Cerebellar:  No cerebellar signs present on finger-nose-finger examination, but Romberg and tandem are moderately severely abnormal.  
 Neurovascular:  Abnormal heart sounds and irregular rhythm, peripheral pulses decreased in both feet, and no carotid bruits. Assessment:  
 
Plan:  
 
New problem of increasing shortness of breath, have recommended he see his PCP to get cardiac evaluation and possible pulmonary evaluation for this increasing shortness of breath was is probably aggravated his underlying underlying cardiac condition and obesity. Patient to get repeat carotid Doppler studies done since been 1 year since his last study and continue his anticoagulation as he is doing well on that. Patient without seizures on Dilantin 300 mg a day taking 100 mg 3 times a day, phenobarbital 32.4 mg 4 times a day. His last drug levels were therapeutic so they would not be checked today. Patient with previous stroke, on Coumadin atrial fibrillation, without recurrent neurologic symptoms, will repeat his carotid Doppler study since been more than 2 years we could not image his vertebral arteries last time to make sure there is no vertebrobasilar insufficiency Patient without seizures for 20 years, we will recheck his levels of his medications, and all of his medications refilled for the patient today for his seizures. He was counseled about his stroke, I think he needs to control his blood pressure, his blood sugars, not to smoke which he does not, and to control his cholesterol his PCP is managing that and he seems to be doing well from that standpoint. We will recheck his levels of his medications to make sure they have not dropped to low in a near the therapeutic range. We discussed the condition with the patient and his wife in detail, and they are happy with his current treatment and medical status, and want to just continue the current dose of medications which we will do. Continue current medications. Check carotid Dopplers in 2 days I reviewed his carotid Doppler studies personally on the PACS system myself done 2 years ago and decided he indeed needs another Doppler His levels of his anticonvulsants were relatively normal one year ago He is to try to exercise regularly, lose weight, and take his vitamins daily Followup in one years time or earlier if needed. Patient condition discuss with patient and his wife in detail. They agree with current plans in therapy Signed By: Marina Pena MD   
 September 18, 2019 This note will not be viewable in [address] If you have questions, please do not hesitate to call me. I look forward to following Mr. Kenney Rangel along with you. Sincerely, Marina Pena MD 
 
 Calm